# Patient Record
Sex: FEMALE | Race: OTHER | NOT HISPANIC OR LATINO | URBAN - METROPOLITAN AREA
[De-identification: names, ages, dates, MRNs, and addresses within clinical notes are randomized per-mention and may not be internally consistent; named-entity substitution may affect disease eponyms.]

---

## 2017-12-04 ENCOUNTER — OUTPATIENT (OUTPATIENT)
Dept: OUTPATIENT SERVICES | Facility: HOSPITAL | Age: 57
LOS: 1 days | Discharge: HOME | End: 2017-12-04
Payer: MEDICAID

## 2017-12-06 DIAGNOSIS — F19.20 OTHER PSYCHOACTIVE SUBSTANCE DEPENDENCE, UNCOMPLICATED: ICD-10-CM

## 2018-01-01 ENCOUNTER — OUTPATIENT (OUTPATIENT)
Dept: OUTPATIENT SERVICES | Facility: HOSPITAL | Age: 58
LOS: 1 days | End: 2018-01-01
Payer: MEDICAID

## 2018-01-18 RX ORDER — AMANTADINE HCL 100 MG
1 CAPSULE ORAL
Qty: 0 | Refills: 0 | COMMUNITY
Start: 2018-01-18

## 2018-01-25 ENCOUNTER — INPATIENT (INPATIENT)
Facility: HOSPITAL | Age: 58
LOS: 1 days | Discharge: HOME | End: 2018-01-27
Attending: HOSPITALIST | Admitting: HOSPITALIST
Payer: MEDICAID

## 2018-01-25 VITALS
TEMPERATURE: 99 F | RESPIRATION RATE: 16 BRPM | DIASTOLIC BLOOD PRESSURE: 102 MMHG | SYSTOLIC BLOOD PRESSURE: 168 MMHG | OXYGEN SATURATION: 98 % | HEART RATE: 88 BPM

## 2018-01-25 DIAGNOSIS — E11.9 TYPE 2 DIABETES MELLITUS WITHOUT COMPLICATIONS: ICD-10-CM

## 2018-01-25 DIAGNOSIS — R03.0 ELEVATED BLOOD-PRESSURE READING, WITHOUT DIAGNOSIS OF HYPERTENSION: ICD-10-CM

## 2018-01-25 DIAGNOSIS — G20 PARKINSON'S DISEASE: ICD-10-CM

## 2018-01-25 DIAGNOSIS — G25.9 EXTRAPYRAMIDAL AND MOVEMENT DISORDER, UNSPECIFIED: ICD-10-CM

## 2018-01-25 DIAGNOSIS — F99 MENTAL DISORDER, NOT OTHERWISE SPECIFIED: ICD-10-CM

## 2018-01-25 DIAGNOSIS — E05.90 THYROTOXICOSIS, UNSPECIFIED WITHOUT THYROTOXIC CRISIS OR STORM: ICD-10-CM

## 2018-01-25 DIAGNOSIS — Z29.9 ENCOUNTER FOR PROPHYLACTIC MEASURES, UNSPECIFIED: ICD-10-CM

## 2018-01-25 LAB
ALBUMIN SERPL ELPH-MCNC: 4.3 G/DL — SIGNIFICANT CHANGE UP (ref 3.3–5)
ALP SERPL-CCNC: 122 U/L — HIGH (ref 40–120)
ALT FLD-CCNC: 11 U/L — SIGNIFICANT CHANGE UP (ref 4–33)
APAP SERPL-MCNC: < 15 UG/ML — LOW (ref 15–25)
AST SERPL-CCNC: 20 U/L — SIGNIFICANT CHANGE UP (ref 4–32)
BARBITURATES MEASUREMENT: NEGATIVE — SIGNIFICANT CHANGE UP
BASE EXCESS BLDV CALC-SCNC: 1.8 MMOL/L — SIGNIFICANT CHANGE UP
BASOPHILS # BLD AUTO: 0.02 K/UL — SIGNIFICANT CHANGE UP (ref 0–0.2)
BASOPHILS NFR BLD AUTO: 0.4 % — SIGNIFICANT CHANGE UP (ref 0–2)
BENZODIAZ SERPL-MCNC: NEGATIVE — SIGNIFICANT CHANGE UP
BILIRUB SERPL-MCNC: 0.3 MG/DL — SIGNIFICANT CHANGE UP (ref 0.2–1.2)
BLOOD GAS VENOUS - CREATININE: 0.47 MG/DL — LOW (ref 0.5–1.3)
BUN SERPL-MCNC: 22 MG/DL — SIGNIFICANT CHANGE UP (ref 7–23)
CALCIUM SERPL-MCNC: 8.6 MG/DL — SIGNIFICANT CHANGE UP (ref 8.4–10.5)
CHLORIDE BLDV-SCNC: 107 MMOL/L — SIGNIFICANT CHANGE UP (ref 96–108)
CHLORIDE SERPL-SCNC: 102 MMOL/L — SIGNIFICANT CHANGE UP (ref 98–107)
CO2 SERPL-SCNC: 23 MMOL/L — SIGNIFICANT CHANGE UP (ref 22–31)
CREAT SERPL-MCNC: 0.59 MG/DL — SIGNIFICANT CHANGE UP (ref 0.5–1.3)
EOSINOPHIL # BLD AUTO: 0.01 K/UL — SIGNIFICANT CHANGE UP (ref 0–0.5)
EOSINOPHIL NFR BLD AUTO: 0.2 % — SIGNIFICANT CHANGE UP (ref 0–6)
ETHANOL BLD-MCNC: < 10 MG/DL — SIGNIFICANT CHANGE UP
GAS PNL BLDV: 139 MMOL/L — SIGNIFICANT CHANGE UP (ref 136–146)
GLUCOSE BLDV-MCNC: 232 — HIGH (ref 70–99)
GLUCOSE SERPL-MCNC: 215 MG/DL — HIGH (ref 70–99)
HBA1C BLD-MCNC: 5.7 % — HIGH (ref 4–5.6)
HCO3 BLDV-SCNC: 26 MMOL/L — SIGNIFICANT CHANGE UP (ref 20–27)
HCT VFR BLD CALC: 43.5 % — SIGNIFICANT CHANGE UP (ref 34.5–45)
HCT VFR BLDV CALC: 41.9 % — SIGNIFICANT CHANGE UP (ref 34.5–45)
HGB BLD-MCNC: 13.5 G/DL — SIGNIFICANT CHANGE UP (ref 11.5–15.5)
HGB BLDV-MCNC: 13.6 G/DL — SIGNIFICANT CHANGE UP (ref 11.5–15.5)
IMM GRANULOCYTES # BLD AUTO: 0.01 # — SIGNIFICANT CHANGE UP
IMM GRANULOCYTES NFR BLD AUTO: 0.2 % — SIGNIFICANT CHANGE UP (ref 0–1.5)
LACTATE BLDV-MCNC: 1.3 MMOL/L — SIGNIFICANT CHANGE UP (ref 0.5–2)
LYMPHOCYTES # BLD AUTO: 1.39 K/UL — SIGNIFICANT CHANGE UP (ref 1–3.3)
LYMPHOCYTES # BLD AUTO: 24.5 % — SIGNIFICANT CHANGE UP (ref 13–44)
MCHC RBC-ENTMCNC: 28.4 PG — SIGNIFICANT CHANGE UP (ref 27–34)
MCHC RBC-ENTMCNC: 31 % — LOW (ref 32–36)
MCV RBC AUTO: 91.4 FL — SIGNIFICANT CHANGE UP (ref 80–100)
MONOCYTES # BLD AUTO: 0.26 K/UL — SIGNIFICANT CHANGE UP (ref 0–0.9)
MONOCYTES NFR BLD AUTO: 4.6 % — SIGNIFICANT CHANGE UP (ref 2–14)
NEUTROPHILS # BLD AUTO: 3.99 K/UL — SIGNIFICANT CHANGE UP (ref 1.8–7.4)
NEUTROPHILS NFR BLD AUTO: 70.1 % — SIGNIFICANT CHANGE UP (ref 43–77)
NRBC # FLD: 0 — SIGNIFICANT CHANGE UP
PCO2 BLDV: 44 MMHG — SIGNIFICANT CHANGE UP (ref 41–51)
PH BLDV: 7.39 PH — SIGNIFICANT CHANGE UP (ref 7.32–7.43)
PLATELET # BLD AUTO: 310 K/UL — SIGNIFICANT CHANGE UP (ref 150–400)
PMV BLD: 9.6 FL — SIGNIFICANT CHANGE UP (ref 7–13)
PO2 BLDV: 80 MMHG — HIGH (ref 35–40)
POTASSIUM BLDV-SCNC: 3.5 MMOL/L — SIGNIFICANT CHANGE UP (ref 3.4–4.5)
POTASSIUM SERPL-MCNC: 3.9 MMOL/L — SIGNIFICANT CHANGE UP (ref 3.5–5.3)
POTASSIUM SERPL-SCNC: 3.9 MMOL/L — SIGNIFICANT CHANGE UP (ref 3.5–5.3)
PROT SERPL-MCNC: 7.5 G/DL — SIGNIFICANT CHANGE UP (ref 6–8.3)
RBC # BLD: 4.76 M/UL — SIGNIFICANT CHANGE UP (ref 3.8–5.2)
RBC # FLD: 13.2 % — SIGNIFICANT CHANGE UP (ref 10.3–14.5)
SALICYLATES SERPL-MCNC: < 5 MG/DL — LOW (ref 15–30)
SAO2 % BLDV: 95.4 % — HIGH (ref 60–85)
SODIUM SERPL-SCNC: 139 MMOL/L — SIGNIFICANT CHANGE UP (ref 135–145)
WBC # BLD: 5.68 K/UL — SIGNIFICANT CHANGE UP (ref 3.8–10.5)
WBC # FLD AUTO: 5.68 K/UL — SIGNIFICANT CHANGE UP (ref 3.8–10.5)

## 2018-01-25 PROCEDURE — 93010 ELECTROCARDIOGRAM REPORT: CPT

## 2018-01-25 PROCEDURE — 99223 1ST HOSP IP/OBS HIGH 75: CPT

## 2018-01-25 RX ORDER — CARBIDOPA AND LEVODOPA 25; 100 MG/1; MG/1
1 TABLET ORAL
Qty: 0 | Refills: 0 | COMMUNITY

## 2018-01-25 RX ORDER — ROPINIROLE 8 MG/1
2 TABLET, FILM COATED, EXTENDED RELEASE ORAL DAILY
Qty: 0 | Refills: 0 | Status: DISCONTINUED | OUTPATIENT
Start: 2018-01-26 | End: 2018-01-27

## 2018-01-25 RX ORDER — PYRIDOXINE HCL (VITAMIN B6) 100 MG
1 TABLET ORAL
Qty: 0 | Refills: 0 | COMMUNITY

## 2018-01-25 RX ORDER — CARBIDOPA AND LEVODOPA 25; 100 MG/1; MG/1
1 TABLET ORAL ONCE
Qty: 0 | Refills: 0 | Status: COMPLETED | OUTPATIENT
Start: 2018-01-25 | End: 2018-01-25

## 2018-01-25 RX ORDER — ROPINIROLE 8 MG/1
2 TABLET, FILM COATED, EXTENDED RELEASE ORAL ONCE
Qty: 0 | Refills: 0 | Status: COMPLETED | OUTPATIENT
Start: 2018-01-25 | End: 2018-01-25

## 2018-01-25 RX ORDER — INSULIN LISPRO 100/ML
VIAL (ML) SUBCUTANEOUS
Qty: 0 | Refills: 0 | Status: DISCONTINUED | OUTPATIENT
Start: 2018-01-25 | End: 2018-01-26

## 2018-01-25 RX ORDER — ENOXAPARIN SODIUM 100 MG/ML
40 INJECTION SUBCUTANEOUS DAILY
Qty: 0 | Refills: 0 | Status: DISCONTINUED | OUTPATIENT
Start: 2018-01-26 | End: 2018-01-26

## 2018-01-25 RX ORDER — DEXTROSE 50 % IN WATER 50 %
25 SYRINGE (ML) INTRAVENOUS ONCE
Qty: 0 | Refills: 0 | Status: DISCONTINUED | OUTPATIENT
Start: 2018-01-25 | End: 2018-01-27

## 2018-01-25 RX ORDER — CARBIDOPA AND LEVODOPA 25; 100 MG/1; MG/1
1 TABLET ORAL
Qty: 0 | Refills: 0 | Status: DISCONTINUED | OUTPATIENT
Start: 2018-01-26 | End: 2018-01-26

## 2018-01-25 RX ORDER — DEXTROSE 50 % IN WATER 50 %
1 SYRINGE (ML) INTRAVENOUS ONCE
Qty: 0 | Refills: 0 | Status: DISCONTINUED | OUTPATIENT
Start: 2018-01-25 | End: 2018-01-27

## 2018-01-25 RX ORDER — ROPINIROLE 8 MG/1
1 TABLET, FILM COATED, EXTENDED RELEASE ORAL
Qty: 0 | Refills: 0 | COMMUNITY

## 2018-01-25 RX ORDER — GLUCAGON INJECTION, SOLUTION 0.5 MG/.1ML
1 INJECTION, SOLUTION SUBCUTANEOUS ONCE
Qty: 0 | Refills: 0 | Status: DISCONTINUED | OUTPATIENT
Start: 2018-01-25 | End: 2018-01-27

## 2018-01-25 RX ORDER — DEXTROSE 50 % IN WATER 50 %
12.5 SYRINGE (ML) INTRAVENOUS ONCE
Qty: 0 | Refills: 0 | Status: DISCONTINUED | OUTPATIENT
Start: 2018-01-25 | End: 2018-01-27

## 2018-01-25 RX ORDER — SODIUM CHLORIDE 9 MG/ML
1000 INJECTION, SOLUTION INTRAVENOUS
Qty: 0 | Refills: 0 | Status: DISCONTINUED | OUTPATIENT
Start: 2018-01-25 | End: 2018-01-27

## 2018-01-25 RX ORDER — INSULIN LISPRO 100/ML
VIAL (ML) SUBCUTANEOUS AT BEDTIME
Qty: 0 | Refills: 0 | Status: DISCONTINUED | OUTPATIENT
Start: 2018-01-25 | End: 2018-01-26

## 2018-01-25 RX ADMIN — CARBIDOPA AND LEVODOPA 1 TABLET(S): 25; 100 TABLET ORAL at 21:11

## 2018-01-25 RX ADMIN — Medication 1 MILLIGRAM(S): at 21:41

## 2018-01-25 RX ADMIN — ROPINIROLE 2 MILLIGRAM(S): 8 TABLET, FILM COATED, EXTENDED RELEASE ORAL at 21:11

## 2018-01-25 NOTE — CONSULT NOTE ADULT - ATTENDING COMMENTS
Patient seen and examined with neurology team and above note reviewed. Patient well known to me form prior outside office visit. She has been having increased feelings of restlessness and what appear to be entire body dyskinesias. She has been on sinemet, amantadine and requip by an outside private neurologist. She reports that she had a Mame scan which was normal. Her exam does not reveal any tremor, bradykinesia, or rigidity or stiffness. Her gait is normal with normal arm swing. We will have her follow up with Dr. Xavier our movement disorder specialist at [address] [place] [address] in [place] at [phone].   She will continue with sinemet 1 tab 4 times daily, amantadine as outlined and requip at night. All questions answered and she understood plan and is willing to comply.

## 2018-01-25 NOTE — ED ADULT NURSE NOTE - OBJECTIVE STATEMENT
pt AO x3, ambulatory with assist, c/o bilateral leg pain, weakness and numbness x 1 year. States she wants to find out "why her body is shutting down".  PMH of parkinson's on carbidopa. Evaluated by provider. Blood obtained and sent to LAB. IV inserted. Right arm 20G. Will continue to monitor.

## 2018-01-25 NOTE — H&P ADULT - HISTORY OF PRESENT ILLNESS
Pt is 56 y/o female with PMhx of Parkinson's disease, possible DM?,  here for evaluation  of worsening of parkinson's symptoms. Pt takes  Sinemet  and Requip  daily, with the last dose taken yesterday, pt is here as she is concerned as the medications " are not working as they should". Pt started seeing new neurologist recently (Dr Roberts), admits that she didn't run out of her meds. Denies recent infections, HA, CP,  SOB , palpitations, denies abdominal  pain, n/v/d,  (+) increasing tremors. Pt states she has been told she has Parkinson's and follows with Dr. Roberts of Neurology at [place] who has been prescribing her Sinemet .  states  that  she might have taken more secondary to stress and was told she needs to get it all out of her system before continuing. Pt states she has had some stress from  who used money in a bank on a bad investment. Pt states she will take 4-5 pills per day depending on how she feels and notes once taking the medication she feels normal again. Pt notes  increased LE weakness and shaking. Pt is a 56 y/o female with PMhx of Parkinson's disease, possible DM?, Hyperthyroid, Anxiety, Depression,  is here for evaluation  of worsening of parkinson's symptoms. Pt takes  Sinemet  and Requip  daily, with the last dose taken yesterday, pt is here as she is concerned as the medications " are not working as they should". Pt started seeing new neurologist recently (Dr Roberts), admits that she didn't run out of her meds. Denies recent infections, HA, CP,  SOB , palpitations, denies abdominal  pain, n/v/d,  (+) increasing tremors. Pt states she has been told she has Parkinson's and follows with Dr. Roberts of Neurology at [place] who has been prescribing her Sinemet .  states  that  she might have taken more secondary to stress and was told she needs to get it all out of her system before continuing. Pt states she has had some stress from  who used money in a bank on a bad investment. Pt states she will take 4-5 pills per day depending on how she feels and notes once taking the medication she feels normal again. Pt notes  increased LE weakness and shaking. Pt is A+O x 3 , accompanied with her ,  very Anxious, No Fever, no dysuria, no HA, no Dizziness, pt was seen by Neurology fellow tonight as well, pt denies dysphagia, no cough , + elevated  BP and Hyperglycemia noted,     Labs: Lactate 1.3, Na 139, K+ 3.9, BUN 22, Creatinine 0.59, Alk Phos 122, WBC 5.68, Hgb 13.5, Platelet 310, Serum TOX Neg., HgbA1c 5.7%,     Vitals: Tem 98.8, HR 88, /102, RR 16, 98 % RA. FS 90 Pt is a 58 y/o female with PMhx of Parkinson's disease, possible DM?, Hyperthyroid, Anxiety, Depression,  is here for evaluation  of worsening of parkinson's symptoms. Pt takes  Sinemet  and Requip  daily, with the last dose taken yesterday, pt is here as she is concerned as the medications " are not working as they should". Pt started seeing new neurologist recently (Dr Roberts), admits that she didn't run out of her meds. Denies recent infections, HA, CP,  SOB , palpitations, denies abdominal  pain, n/v/d,  (+) increasing tremors. Pt states she has been told she has Parkinson's and follows with Dr. Roberts of Neurology at [place] who has been prescribing her Sinemet .  states  that  she might have taken more secondary to stress and was told she needs to get it all out of her system before continuing. Pt states she has had some stress from  who used money in a bank on a bad investment. Pt states she will take 4-5 pills per day depending on how she feels and notes once taking the medication she feels normal again. Pt notes  increased LE weakness and shaking. Pt is A+O x 3 , accompanied with her ,  very Anxious, No Fever, no dysuria, no HA, no Dizziness, pt was seen by Neurology fellow tonight as well, pt denies dysphagia, no cough , + elevated  BP and Hyperglycemia noted, pt denies stool/urine incontinent ,     Labs: Lactate 1.3, Na 139, K+ 3.9, BUN 22, Creatinine 0.59, Alk Phos 122, WBC 5.68, Hgb 13.5, Platelet 310, Serum TOX Neg., HgbA1c 5.7%,     Vitals: Tem 98.8, HR 88, /102, RR 16, 98 % RA. FS 90

## 2018-01-25 NOTE — CONSULT NOTE ADULT - ASSESSMENT
58 yo female with questionable history of Parkinsons disease currently on Sinemet. Exam consistent with conversion disorder.    -c/w sinemet 25/100 4x/day for now.  -Psychiatry consult for paranioa 58 yo female with questionable history of Parkinsons disease currently on Sinemet.     -c/w sinemet 25/100 4x/day for now.  -Psychiatry consult for paranioa  -outpatient follow up with movement disorder specialist to confirm dx of parkinson's  -continue medical management

## 2018-01-25 NOTE — CONSULT NOTE ADULT - SUBJECTIVE AND OBJECTIVE BOX
HPI:  Pt is 56 y/o female presents to ED with worsening gait and uncontrollable mood. Patient has had bilateral shaking of both arms that is on and off and goes away with one pill which only lasts two hours. Also in the last 4 years has been increasingly paranoid about where her  is and what he is doing          MEDICATIONS  (STANDING):  carbidopa/levodopa  25/100 1 Tablet(s) Oral Once  dextrose 5%. 1000 milliLiter(s) (50 mL/Hr) IV Continuous <Continuous>  dextrose 50% Injectable 12.5 Gram(s) IV Push once  dextrose 50% Injectable 25 Gram(s) IV Push once  dextrose 50% Injectable 25 Gram(s) IV Push once  insulin lispro (HumaLOG) corrective regimen sliding scale   SubCutaneous three times a day before meals  insulin lispro (HumaLOG) corrective regimen sliding scale   SubCutaneous at bedtime  rOPINIRole 2 milliGRAM(s) Oral Once    MEDICATIONS  (PRN):  dextrose Gel 1 Dose(s) Oral once PRN Blood Glucose LESS THAN 70 milliGRAM(s)/deciliter  glucagon  Injectable 1 milliGRAM(s) IntraMuscular once PRN Glucose LESS THAN 70 milligrams/deciliter    PAST MEDICAL & SURGICAL HISTORY:  Diabetes  Parkinson disease    FAMILY HISTORY:    Allergies    No Known Allergies    Intolerances        SHx - No smoking, No ETOH, No drug abuse      Review of Systems:  CONSTITUTIONAL:  No weight loss, fever, chills, weakness or fatigue.  HEENT:  Eyes:  No visual loss, blurred vision, double vision or yellow sclerae. Ears, Nose, Throat:  No hearing loss, sneezing, congestion, runny nose or sore throat.  SKIN:  No rash or itching.  CARDIOVASCULAR:  No chest pain, chest pressure or chest discomfort. No palpitations or edema.  RESPIRATORY:  No shortness of breath, cough or sputum.  GASTROINTESTINAL:  No anorexia, nausea, vomiting or diarrhea. No abdominal pain or blood.  GENITOURINARY:  NO Burning on urination.   NEUROLOGICAL: See HPI  MUSCULOSKELETAL:  No muscle, back pain, joint pain or stiffness.  HEMATOLOGIC:  No anemia, bleeding or bruising.  LYMPHATICS:  No enlarged nodes. No history of splenectomy.  PSYCHIATRIC:  No history of depression or anxiety.  ENDOCRINOLOGIC:  No reports of sweating, cold or heat intolerance. No polyuria or polydipsia.  ALLERGIES:  No history of asthma, hives, eczema or rhinitis.        Vital Signs Last 24 Hrs  T(C): 37.1 (25 Jan 2018 15:39), Max: 37.1 (25 Jan 2018 15:39)  T(F): 98.8 (25 Jan 2018 15:39), Max: 98.8 (25 Jan 2018 15:39)  HR: 88 (25 Jan 2018 15:39) (88 - 88)  BP: 168/102 (25 Jan 2018 15:39) (168/102 - 168/102)  BP(mean): --  RR: 16 (25 Jan 2018 15:39) (16 - 16)  SpO2: 98% (25 Jan 2018 15:39) (98% - 98%)    General Exam:   General appearance: No acute distress                   Neurological Exam:  Mental Status: Orientated to self, date and place.  Attention intact.  No dysarthria, aphasia or neglect.  Knowledge intact.  Registration intact.  Short and long term memory grossly intact.      Cranial Nerves: CN I - not tested.  PERRL, EOMI, VFF, no nystagmus or diplopia.  No APD.  Fundi not visualized bilaterally.  CN V1-3 intact to light touch and pinprick.  No facial asymmetry.  Hearing intact to finger rub bilaterally.  Tongue, uvula and palate midline.  Sternocleidomastoid and Trapezius intact bilaterally.    Motor:   Tone: bilateral rigidity that goes away with distraction              Strength:     [] Upper extremity                      Delt       Bicep    Tricep                                                  R         5/5        5/5        5/5       5/5                                               L          5/5        5/5        5/5       5/5  [] Lower extremity                       HF          KE          KF        DF         PF                                               R        5/5        5/5        5/5       5/5       5/5                                               L         5/5        5/5       5/5       5/5        5/5  Pronator drift: none                 Dysmetria: None to finger-nose-finger or heel-shin-heel  No truncal ataxia.    Tremor: No resting, postural or action tremor.  No myoclonus.    Sensation: intact to light touch, pinprick, vibration and proprioception    Deep Tendon Reflexes: 1+ bilateral biceps, triceps, brachioradialis, knee and ankle  Toes flexor bilaterally    Gait: slow steppage    Other:    01-25    139  |  102  |  22  ----------------------------<  215<H>  3.9   |  23  |  0.59    Ca    8.6      25 Jan 2018 18:03    TPro  7.5  /  Alb  4.3  /  TBili  0.3  /  DBili  x   /  AST  20  /  ALT  11  /  AlkPhos  122<H>  01-25 01-25    139  |  102  |  22  ----------------------------<  215<H>  3.9   |  23  |  0.59    Ca    8.6      25 Jan 2018 18:03    TPro  7.5  /  Alb  4.3  /  TBili  0.3  /  DBili  x   /  AST  20  /  ALT  11  /  AlkPhos  122<H>  01-25                          13.5   5.68  )-----------( 310      ( 25 Jan 2018 18:03 )             43.5

## 2018-01-25 NOTE — H&P ADULT - ASSESSMENT
Pt is a 56 y/o female with PMhx of Parkinson's disease, possible DM?, Hyperthyroid, Anxiety, Depression,  is here for evaluation  of worsening of parkinson's symptoms. Pt takes  Sinemet  and Requip  daily, with the last dose taken yesterday, pt is here as she is concerned as the medications " are not working as they should". Pt started seeing new neurologist recently (Dr Roberts), admits that she didn't run out of her meds. Denies recent infections, HA, CP,  SOB , palpitations, denies abdominal  pain, n/v/d,  (+) increasing tremors. Pt states she has been told she has Parkinson's and follows with Dr. Roberts of Neurology at [place] who has been prescribing her Sinemet .  states  that  she might have taken more secondary to stress and was told she needs to get it all out of her system before continuing. Pt states she has had some stress from  who used money in a bank on a bad investment. Pt states she will take 4-5 pills per day depending on how she feels and notes once taking the medication she feels normal again. Pt notes  increased LE weakness and shaking. Pt is A+O x 3 , accompanied with her ,  very Anxious, No Fever, no dysuria, no HA, no Dizziness, pt was seen by Neurology fellow tonight as well, pt denies dysphagia, no cough , + elevated  BP and Hyperglycemia noted, Pt is a 58 y/o female with PMhx of Parkinson's disease, possible DM?, Hyperthyroid, Anxiety, Depression,  is here for evaluation  of worsening of parkinson's symptoms. Pt takes  Sinemet  and Requip  daily, with the last dose taken yesterday, pt is here as she is concerned as the medications " are not working as they should". Pt started seeing new neurologist recently (Dr Roberts), admits that she didn't run out of her meds. Denies recent infections, HA, CP,  SOB , palpitations, denies abdominal  pain, n/v/d,  (+) increasing tremors. Pt states she has been told she has Parkinson's and follows with Dr. Roberts of Neurology at [place] who has been prescribing her Sinemet .  states  that  she might have taken more secondary to stress and was told she needs to get it all out of her system before continuing. Pt states she has had some stress from  who used money in a bank on a bad investment. Pt states she will take 4-5 pills per day depending on how she feels and notes once taking the medication she feels normal again. Pt notes  increased LE weakness and shaking. Pt is A+O x 3 , accompanied with her ,  very Anxious, No Fever, no dysuria, no HA, no Dizziness, pt was seen by Neurology fellow tonight as well, pt denies dysphagia, no cough , + elevated  BP and Hyperglycemia noted, pt denies stool/urine incontinent ,

## 2018-01-25 NOTE — H&P ADULT - PROBLEM SELECTOR PLAN 1
Neurology follow up, on Requip and Sinemet for now, UA, Urine Culture, Urine TOX, Fall/aspiration precaution, PT consult,   Speech and swallow consult,   CT Head no contrast, Chest X Ray, ECG ,   CBC, CMP F/U

## 2018-01-25 NOTE — ED ADULT TRIAGE NOTE - CHIEF COMPLAINT QUOTE
Pt. c/o b/l leg pain, weakness and numbness x 1 year. States she wants to find out "why her body is shutting down". h/o parkinson's on carbidopa. Pt. says she's running out of medication and her children wont get her more.

## 2018-01-25 NOTE — H&P ADULT - PMH
Parkinson disease Diabetes    Parkinson disease Anxiety    Depression (emotion)    Diabetes    Hyperthyroidism    Parkinson disease

## 2018-01-25 NOTE — ED PROVIDER NOTE - ATTENDING CONTRIBUTION TO CARE
Pt was seen and evaluated by me. Pt states she has been told she has Parkinson's and follows with Dr. Roberts of Neurology at [place] who has been prescribing her Cavalevodopa.  states he things she has taken more secondary to stress and was told she needs to get it all out of her system before continuing. Pt states she has had some stress from  who used money in a bank on a bad investment. Pt states she will take 4-5 pills per day depending on how she feels and notes once taking the medication she feels normal again. Pt notes since running out having increased LE weakness and shaking. Denies any headache, fever, chills, SOB, chest pain, or abd pain. Lungs CTA b/l. RRR. Abd soft, non-tender. Noted to have an intention tremor.

## 2018-01-25 NOTE — H&P ADULT - MUSCULOSKELETAL
details… detailed exam ROM intact/no joint erythema/no calf tenderness/no joint swelling/no joint warmth/normal strength

## 2018-01-25 NOTE — ED PROVIDER NOTE - OBJECTIVE STATEMENT
Pt is 58 y/o female with PMhx of Parkinson's disease here for kory of worsening of parkinson's symptoms. Pt takes  sinemat and requip daily, with the last dose taken yesterday, pt is here as she is concerned as the medications " are not working as they should". Pt started seeing new neurologist recently (Dr Roberts), admits that she didn't run out of her meds. Denies recent infections, HA, cpm sob, palpitations, denies abd pain, n/v/d. (+) increasing tremors.

## 2018-01-25 NOTE — ED PROVIDER NOTE - MEDICAL DECISION MAKING DETAILS
58 y/o female with 58 y/o female with increased leg weakness with history of Parkinson's but running out of her meds. Likely Parkinson's related. Labs, Medication.

## 2018-01-26 ENCOUNTER — TRANSCRIPTION ENCOUNTER (OUTPATIENT)
Age: 58
End: 2018-01-26

## 2018-01-26 DIAGNOSIS — F19.20 OTHER PSYCHOACTIVE SUBSTANCE DEPENDENCE, UNCOMPLICATED: ICD-10-CM

## 2018-01-26 LAB
ALBUMIN SERPL ELPH-MCNC: 3.8 G/DL — SIGNIFICANT CHANGE UP (ref 3.3–5)
ALP SERPL-CCNC: 106 U/L — SIGNIFICANT CHANGE UP (ref 40–120)
ALT FLD-CCNC: < 4 U/L — LOW (ref 4–33)
AMPHET UR-MCNC: NEGATIVE — SIGNIFICANT CHANGE UP
APAP SERPL-MCNC: < 15 UG/ML — LOW (ref 15–25)
APPEARANCE UR: CLEAR — SIGNIFICANT CHANGE UP
APTT BLD: 30.1 SEC — SIGNIFICANT CHANGE UP (ref 27.5–37.4)
AST SERPL-CCNC: 18 U/L — SIGNIFICANT CHANGE UP (ref 4–32)
BACTERIA # UR AUTO: SIGNIFICANT CHANGE UP
BARBITURATES MEASUREMENT: NEGATIVE — SIGNIFICANT CHANGE UP
BARBITURATES UR SCN-MCNC: NEGATIVE — SIGNIFICANT CHANGE UP
BASOPHILS # BLD AUTO: 0.02 K/UL — SIGNIFICANT CHANGE UP (ref 0–0.2)
BASOPHILS NFR BLD AUTO: 0.4 % — SIGNIFICANT CHANGE UP (ref 0–2)
BENZODIAZ SERPL-MCNC: NEGATIVE — SIGNIFICANT CHANGE UP
BENZODIAZ UR-MCNC: NEGATIVE — SIGNIFICANT CHANGE UP
BILIRUB SERPL-MCNC: 0.3 MG/DL — SIGNIFICANT CHANGE UP (ref 0.2–1.2)
BILIRUB UR-MCNC: NEGATIVE — SIGNIFICANT CHANGE UP
BLOOD UR QL VISUAL: HIGH
BUN SERPL-MCNC: 21 MG/DL — SIGNIFICANT CHANGE UP (ref 7–23)
CALCIUM SERPL-MCNC: 8.4 MG/DL — SIGNIFICANT CHANGE UP (ref 8.4–10.5)
CANNABINOIDS UR-MCNC: NEGATIVE — SIGNIFICANT CHANGE UP
CHLORIDE SERPL-SCNC: 104 MMOL/L — SIGNIFICANT CHANGE UP (ref 98–107)
CK SERPL-CCNC: 74 U/L — SIGNIFICANT CHANGE UP (ref 25–170)
CO2 SERPL-SCNC: 26 MMOL/L — SIGNIFICANT CHANGE UP (ref 22–31)
COCAINE METAB.OTHER UR-MCNC: NEGATIVE — SIGNIFICANT CHANGE UP
COLOR SPEC: YELLOW — SIGNIFICANT CHANGE UP
CREAT SERPL-MCNC: 0.54 MG/DL — SIGNIFICANT CHANGE UP (ref 0.5–1.3)
EOSINOPHIL # BLD AUTO: 0.04 K/UL — SIGNIFICANT CHANGE UP (ref 0–0.5)
EOSINOPHIL NFR BLD AUTO: 0.8 % — SIGNIFICANT CHANGE UP (ref 0–6)
ETHANOL BLD-MCNC: < 10 MG/DL — SIGNIFICANT CHANGE UP
FOLATE SERPL-MCNC: 17.4 NG/ML — SIGNIFICANT CHANGE UP (ref 4.7–20)
GLUCOSE SERPL-MCNC: 83 MG/DL — SIGNIFICANT CHANGE UP (ref 70–99)
GLUCOSE UR-MCNC: NEGATIVE — SIGNIFICANT CHANGE UP
HAV IGM SER-ACNC: NONREACTIVE — SIGNIFICANT CHANGE UP
HBV CORE IGM SER-ACNC: NONREACTIVE — SIGNIFICANT CHANGE UP
HBV SURFACE AG SER-ACNC: NONREACTIVE — SIGNIFICANT CHANGE UP
HCT VFR BLD CALC: 40.3 % — SIGNIFICANT CHANGE UP (ref 34.5–45)
HCV AB S/CO SERPL IA: 0.2 S/CO — SIGNIFICANT CHANGE UP
HCV AB SERPL-IMP: SIGNIFICANT CHANGE UP
HGB BLD-MCNC: 12.6 G/DL — SIGNIFICANT CHANGE UP (ref 11.5–15.5)
HYALINE CASTS # UR AUTO: SIGNIFICANT CHANGE UP (ref 0–?)
IMM GRANULOCYTES # BLD AUTO: 0.01 # — SIGNIFICANT CHANGE UP
IMM GRANULOCYTES NFR BLD AUTO: 0.2 % — SIGNIFICANT CHANGE UP (ref 0–1.5)
INR BLD: 1.02 — SIGNIFICANT CHANGE UP (ref 0.88–1.17)
IRON SATN MFR SERPL: 240 UG/DL — SIGNIFICANT CHANGE UP (ref 140–530)
IRON SATN MFR SERPL: 64 UG/DL — SIGNIFICANT CHANGE UP (ref 30–160)
KETONES UR-MCNC: SIGNIFICANT CHANGE UP
LDH SERPL L TO P-CCNC: 213 U/L — SIGNIFICANT CHANGE UP (ref 135–225)
LEUKOCYTE ESTERASE UR-ACNC: HIGH
LYMPHOCYTES # BLD AUTO: 1.7 K/UL — SIGNIFICANT CHANGE UP (ref 1–3.3)
LYMPHOCYTES # BLD AUTO: 34.6 % — SIGNIFICANT CHANGE UP (ref 13–44)
MAGNESIUM SERPL-MCNC: 2.3 MG/DL — SIGNIFICANT CHANGE UP (ref 1.6–2.6)
MCHC RBC-ENTMCNC: 28.6 PG — SIGNIFICANT CHANGE UP (ref 27–34)
MCHC RBC-ENTMCNC: 31.3 % — LOW (ref 32–36)
MCV RBC AUTO: 91.6 FL — SIGNIFICANT CHANGE UP (ref 80–100)
METHADONE UR-MCNC: NEGATIVE — SIGNIFICANT CHANGE UP
MONOCYTES # BLD AUTO: 0.39 K/UL — SIGNIFICANT CHANGE UP (ref 0–0.9)
MONOCYTES NFR BLD AUTO: 7.9 % — SIGNIFICANT CHANGE UP (ref 2–14)
MUCOUS THREADS # UR AUTO: SIGNIFICANT CHANGE UP
NEUTROPHILS # BLD AUTO: 2.75 K/UL — SIGNIFICANT CHANGE UP (ref 1.8–7.4)
NEUTROPHILS NFR BLD AUTO: 56.1 % — SIGNIFICANT CHANGE UP (ref 43–77)
NITRITE UR-MCNC: NEGATIVE — SIGNIFICANT CHANGE UP
NON-SQ EPI CELLS # UR AUTO: <1 — SIGNIFICANT CHANGE UP
NRBC # FLD: 0 — SIGNIFICANT CHANGE UP
OPIATES UR-MCNC: NEGATIVE — SIGNIFICANT CHANGE UP
OXYCODONE UR-MCNC: NEGATIVE — SIGNIFICANT CHANGE UP
PCP UR-MCNC: NEGATIVE — SIGNIFICANT CHANGE UP
PH UR: 6 — SIGNIFICANT CHANGE UP (ref 4.6–8)
PHOSPHATE SERPL-MCNC: 4.4 MG/DL — SIGNIFICANT CHANGE UP (ref 2.5–4.5)
PLATELET # BLD AUTO: 291 K/UL — SIGNIFICANT CHANGE UP (ref 150–400)
PMV BLD: 9.6 FL — SIGNIFICANT CHANGE UP (ref 7–13)
POTASSIUM SERPL-MCNC: 4.1 MMOL/L — SIGNIFICANT CHANGE UP (ref 3.5–5.3)
POTASSIUM SERPL-SCNC: 4.1 MMOL/L — SIGNIFICANT CHANGE UP (ref 3.5–5.3)
PROT SERPL-MCNC: 6.5 G/DL — SIGNIFICANT CHANGE UP (ref 6–8.3)
PROT UR-MCNC: 30 MG/DL — HIGH
PROTHROM AB SERPL-ACNC: 11.3 SEC — SIGNIFICANT CHANGE UP (ref 9.8–13.1)
RBC # BLD: 4.4 M/UL — SIGNIFICANT CHANGE UP (ref 3.8–5.2)
RBC # FLD: 13.3 % — SIGNIFICANT CHANGE UP (ref 10.3–14.5)
RBC CASTS # UR COMP ASSIST: SIGNIFICANT CHANGE UP (ref 0–?)
SALICYLATES SERPL-MCNC: < 5 MG/DL — LOW (ref 15–30)
SODIUM SERPL-SCNC: 142 MMOL/L — SIGNIFICANT CHANGE UP (ref 135–145)
SP GR SPEC: 1.03 — SIGNIFICANT CHANGE UP (ref 1–1.04)
SQUAMOUS # UR AUTO: SIGNIFICANT CHANGE UP
T4 FREE SERPL-MCNC: 1.36 NG/DL — SIGNIFICANT CHANGE UP (ref 0.9–1.8)
TSH SERPL-MCNC: 0.37 UIU/ML — SIGNIFICANT CHANGE UP (ref 0.27–4.2)
UIBC SERPL-MCNC: 176 UG/DL — SIGNIFICANT CHANGE UP (ref 110–370)
UROBILINOGEN FLD QL: NORMAL MG/DL — SIGNIFICANT CHANGE UP
VIT B12 SERPL-MCNC: 506 PG/ML — SIGNIFICANT CHANGE UP (ref 200–900)
WBC # BLD: 4.91 K/UL — SIGNIFICANT CHANGE UP (ref 3.8–10.5)
WBC # FLD AUTO: 4.91 K/UL — SIGNIFICANT CHANGE UP (ref 3.8–10.5)
WBC UR QL: >50 — HIGH (ref 0–?)

## 2018-01-26 PROCEDURE — 71046 X-RAY EXAM CHEST 2 VIEWS: CPT | Mod: 26

## 2018-01-26 PROCEDURE — 70450 CT HEAD/BRAIN W/O DYE: CPT | Mod: 26

## 2018-01-26 PROCEDURE — 99233 SBSQ HOSP IP/OBS HIGH 50: CPT | Mod: GC

## 2018-01-26 PROCEDURE — 99222 1ST HOSP IP/OBS MODERATE 55: CPT | Mod: GC

## 2018-01-26 RX ORDER — QUETIAPINE FUMARATE 200 MG/1
25 TABLET, FILM COATED ORAL AT BEDTIME
Qty: 0 | Refills: 0 | Status: DISCONTINUED | OUTPATIENT
Start: 2018-01-26 | End: 2018-01-27

## 2018-01-26 RX ORDER — CARBIDOPA AND LEVODOPA 25; 100 MG/1; MG/1
1 TABLET ORAL
Qty: 0 | Refills: 0 | Status: DISCONTINUED | OUTPATIENT
Start: 2018-01-26 | End: 2018-01-27

## 2018-01-26 RX ORDER — CEFTRIAXONE 500 MG/1
1 INJECTION, POWDER, FOR SOLUTION INTRAMUSCULAR; INTRAVENOUS ONCE
Qty: 0 | Refills: 0 | Status: COMPLETED | OUTPATIENT
Start: 2018-01-26 | End: 2018-01-26

## 2018-01-26 RX ORDER — LANOLIN ALCOHOL/MO/W.PET/CERES
3 CREAM (GRAM) TOPICAL AT BEDTIME
Qty: 0 | Refills: 0 | Status: DISCONTINUED | OUTPATIENT
Start: 2018-01-26 | End: 2018-01-27

## 2018-01-26 RX ORDER — LACTOBACILLUS ACIDOPHILUS 100MM CELL
1 CAPSULE ORAL EVERY 12 HOURS
Qty: 0 | Refills: 0 | Status: DISCONTINUED | OUTPATIENT
Start: 2018-01-26 | End: 2018-01-27

## 2018-01-26 RX ORDER — AMANTADINE HCL 100 MG
100 CAPSULE ORAL
Qty: 0 | Refills: 0 | Status: DISCONTINUED | OUTPATIENT
Start: 2018-01-26 | End: 2018-01-27

## 2018-01-26 RX ADMIN — CARBIDOPA AND LEVODOPA 1 TABLET(S): 25; 100 TABLET ORAL at 17:15

## 2018-01-26 RX ADMIN — CEFTRIAXONE 100 GRAM(S): 500 INJECTION, POWDER, FOR SOLUTION INTRAMUSCULAR; INTRAVENOUS at 02:20

## 2018-01-26 RX ADMIN — ROPINIROLE 2 MILLIGRAM(S): 8 TABLET, FILM COATED, EXTENDED RELEASE ORAL at 08:36

## 2018-01-26 RX ADMIN — CARBIDOPA AND LEVODOPA 1 TABLET(S): 25; 100 TABLET ORAL at 08:36

## 2018-01-26 RX ADMIN — Medication 1 TABLET(S): at 17:17

## 2018-01-26 RX ADMIN — Medication 100 MILLIGRAM(S): at 19:17

## 2018-01-26 RX ADMIN — Medication 1 TABLET(S): at 06:34

## 2018-01-26 RX ADMIN — Medication 3 MILLIGRAM(S): at 02:19

## 2018-01-26 RX ADMIN — Medication 1 MILLIGRAM(S): at 17:14

## 2018-01-26 RX ADMIN — Medication 1 TABLET(S): at 19:17

## 2018-01-26 RX ADMIN — Medication 1 MILLIGRAM(S): at 22:22

## 2018-01-26 RX ADMIN — CARBIDOPA AND LEVODOPA 1 TABLET(S): 25; 100 TABLET ORAL at 19:17

## 2018-01-26 RX ADMIN — CARBIDOPA AND LEVODOPA 1 TABLET(S): 25; 100 TABLET ORAL at 11:20

## 2018-01-26 RX ADMIN — CARBIDOPA AND LEVODOPA 1 TABLET(S): 25; 100 TABLET ORAL at 02:19

## 2018-01-26 RX ADMIN — QUETIAPINE FUMARATE 25 MILLIGRAM(S): 200 TABLET, FILM COATED ORAL at 22:22

## 2018-01-26 NOTE — BEHAVIORAL HEALTH ASSESSMENT NOTE - SUMMARY
Pt is a 56 yo  [place] female, unemployed, domiciled with family, with history of Sinemet dependence, currently seen at [facility] by Dr. Donis, no past psychiatric hospitalizations, no past suicide attempts, no other substance use, PMH ? Parkinson's disease, Hyperthyroid, is here for evaluation of worsening of parkinson's symptoms on Sinemet and Requip daily.  Pt was seen by neurology.  "Her exam does not reveal any tremor, bradykinesia, or rigidity or stiffness. Her gait is normal with normal arm swing."  Psych consulted for conversion disorder.      On exam, pt AAOx4.  She reports rigidity and Parkinsonian symptoms which are relieved by Sinemet.  Clinical exam by neurology does not suggest Parkinson's.  Presentation possibly r/t conversion disorder.  Pt also presents with paranoia.  Recommend starting Seroquel 25mg qHS.  Pt may be more receptive to this over other antipsychotics as it is safely used in Parkinson's.  Consider discontinuing Ativan 1mg qHS which pt uses primarily for sleep.  PRNs as below.  Monitor EKG for QTC.  Will follow.

## 2018-01-26 NOTE — DISCHARGE NOTE ADULT - CARE PROVIDERS DIRECT ADDRESSES
,[email or web address],DirectAddress_[email or web address] ,[email or web address],DirectAddress_Unknown,DirectAddress_Unknown

## 2018-01-26 NOTE — PROGRESS NOTE ADULT - PROBLEM SELECTOR PLAN 4
DVT Prophylaxis: SQ Lovenox  Dispo: PT consult DVT Prophylaxis: pt ambulates; IMPROVE score 0. no indication for pharmacological DVT ppx  Dispo: home with neurology f/u

## 2018-01-26 NOTE — DISCHARGE NOTE ADULT - PATIENT PORTAL LINK FT
“You can access the FollowHealth Patient Portal, offered by [facility], by registering with the following website: [email or web address]”

## 2018-01-26 NOTE — DISCHARGE NOTE ADULT - HOSPITAL COURSE
HPI:   58 y/o female with PMhx of Parkinson's disease, possible DM?, Hyperthyroid, Anxiety, Depression,  is here for evaluation  of worsening of parkinson's symptoms. Pt takes  Sinemet  and Requip  daily, with the last dose taken yesterday, pt is here as she is concerned as the medications " are not working as they should". Pt started seeing new neurologist recently (Dr Roberts), admits that she didn't run out of her meds. Denies recent infections, HA, CP,  SOB , palpitations, denies abdominal  pain, n/v/d,  (+) increasing tremors. Pt states she has been told she has Parkinson's and follows with Dr. Roberts of Neurology at [place] who has been prescribing her Sinemet .  states  that  she might have taken more secondary to stress and was told she needs to get it all out of her system before continuing. Pt states she has had some stress from  who used money in a bank on a bad investment. Pt states she will take 4-5 pills per day depending on how she feels and notes once taking the medication she feels normal again. Pt notes  increased LE weakness and shaking. Pt is A+O x 3 , accompanied with her ,  very Anxious, No Fever, no dysuria, no HA, no Dizziness, pt was seen by Neurology fellow tonight as well, pt denies dysphagia, no cough , + elevated  BP and Hyperglycemia noted, pt denies stool/urine incontinent ,       Hospital Course:     Labs: Lactate 1.3, Na 139, K+ 3.9, BUN 22, Creatinine 0.59, Alk Phos 122, WBC 5.68, Hgb 13.5, Platelet 310, Serum TOX Neg., HgbA1c 5.7%,     Vitals: Tem 98.8, HR 88, /102, RR 16, 98 % RA. FS 90    Neurology consulted. Psych consulted. HPI:   58 y/o female with PMhx of Parkinson's disease, possible DM?, Hyperthyroid, Anxiety, Depression,  is here for evaluation  of worsening of parkinsonism symptoms. Pt takes  Sinemet  and Requip  daily, with the last dose taken yesterday, pt is here as she is concerned as the medications " are not working as they should". Pt started seeing new neurologist recently (Dr Roberts), admits that she didn't run out of her meds. Denies recent infections, HA, CP,  SOB , palpitations, denies abdominal  pain, n/v/d,  (+) increasing tremors. Pt states she has been told she has Parkinson's and follows with Dr. Roberts of Neurology at [place] who has been prescribing her Sinemet .  states  that  she might have taken more secondary to stress and was told she needs to get it all out of her system before continuing. Pt states she has had some stress from  who used money in a bank on a bad investment. Pt states she will take 4-5 pills per day depending on how she feels and notes once taking the medication she feels normal again. Pt notes  increased LE weakness and shaking. Pt is A+O x 3 , accompanied with her ,  very Anxious, No Fever, no dysuria, no HA, no Dizziness, pt was seen by Neurology fellow tonight as well, pt denies dysphagia, no cough , + elevated  BP and Hyperglycemia noted, pt denies stool/urine incontinent ,     Hospital Course:   Pt admitted to medicine. Pt with positive UA but no symptoms. Pt received ceftriaxone x1 and antibiotics were held for asymptomatic bacteruria. Neurology consulted. Pt with unclear diagnosis. Unlikely Parkinson's; possibly other movement disorder and/or psychosomatic component. Psych consulted. Pt see's Dr. Donis at [facility]. Pt takes excess Sinemet at home, more than she admits. She has been paranoid about  for years prior to movement problems. Psych recommended seroquel while tapering ativan. HPI:   58 y/o female with PMhx of Parkinson's disease, possible DM?, Hyperthyroid, Anxiety, Depression,  is here for evaluation  of worsening of parkinsonism symptoms. Pt takes  Sinemet  and Requip  daily, with the last dose taken yesterday, pt is here as she is concerned as the medications " are not working as they should". Pt started seeing new neurologist recently (Dr Roberts), admits that she didn't run out of her meds. Denies recent infections, HA, CP,  SOB , palpitations, denies abdominal  pain, n/v/d,  (+) increasing tremors. Pt states she has been told she has Parkinson's and follows with Dr. Roberts of Neurology at [place] who has been prescribing her Sinemet .  states  that  she might have taken more secondary to stress and was told she needs to get it all out of her system before continuing. Pt states she has had some stress from  who used money in a bank on a bad investment. Pt states she will take 4-5 pills per day depending on how she feels and notes once taking the medication she feels normal again. Pt notes  increased LE weakness and shaking. Pt is A+O x 3 , accompanied with her ,  very Anxious, No Fever, no dysuria, no HA, no Dizziness, pt was seen by Neurology fellow tonight as well, pt denies dysphagia, no cough , + elevated  BP and Hyperglycemia noted, pt denies stool/urine incontinent ,     Hospital Course:   Pt admitted to medicine. Pt with positive UA but no symptoms. Pt received ceftriaxone x1 and antibiotics were held for asymptomatic bacteruria. Neurology consulted. Pt with unclear diagnosis. Unlikely Parkinson's; possibly other movement disorder and/or psychosomatic component. Psych consulted. Pt see's Dr. Donis at [facility]. Pt takes excess Sinemet at home, more than she admits. She has been paranoid about  for years prior to movement problems. Psych recommended seroquel while tapering ativan. Spoke with patient, and her daughter with patient's permission she will continue her Parkinson's medications as prescribed and continue to f/u with psychiatry for possible conversion disorder.

## 2018-01-26 NOTE — PROGRESS NOTE ADULT - PROBLEM SELECTOR PLAN 1
Neurology follow up, on Requip and Sinemet for now, UA, Urine Culture, Urine TOX, Fall/aspiration precaution, PT consult,   Speech and swallow consult,   CT Head no contrast, Chest X Ray, ECG ,   CBC, CMP F/U questionable history; pt says some neurologists says she does not have PD. Possibly other etiology or conversion disorder.   - c/w sinemet 20-100mg QID, requip 2 mg daily  - neurology consulted; f/u recs  - call outpatient neurologist (Dr. Roberts) and daughter for collateral info

## 2018-01-26 NOTE — BEHAVIORAL HEALTH ASSESSMENT NOTE - NSBHCHARTREVIEWLAB_PSY_A_CORE FT
12.6   4.91  )-----------( 291      ( 2018 05:24 )             40.3         142  |  104  |  21  ----------------------------<  83  4.1   |  26  |  0.54    Ca    8.4      2018 05:24  Phos  4.4       Mg     2.3         TPro  6.5  /  Alb  3.8  /  TBili  0.3  /  DBili  x   /  AST  18  /  ALT  < 4<L>  /  AlkPhos  106      Urinalysis Basic - ( 2018 23:50 )    Color: YELLOW / Appearance: CLEAR / S.035 / pH: 6.0  Gluc: NEGATIVE / Ketone: TRACE  / Bili: NEGATIVE / Urobili: NORMAL mg/dL   Blood: TRACE / Protein: 30 mg/dL / Nitrite: NEGATIVE   Leuk Esterase: LARGE / RBC: 2-5 / WBC >50   Sq Epi: OCC / Non Sq Epi: x / Bacteria: FEW

## 2018-01-26 NOTE — PROGRESS NOTE ADULT - SUBJECTIVE AND OBJECTIVE BOX
Tu Damon MD  PGY-1 | Internal Medicine  [phone] / [phone]      Patient is a 57y old  Female who presents with a chief complaint of Weakness, tremor, Anxiety (2018 20:21)      SUBJECTIVE / OVERNIGHT EVENTS: Pt admitted overnight for worsening Parkinson's symptoms. Pt feels like body is shutting down and cannot move as much. Pt feels numb all over 2/2 not taking Sinemet earlier. Denies headache, dizziness, vision changes, n/v, CP, SOB, abd pain, diarrhea, dysuria, urinary frequency.     MEDICATIONS  (STANDING):  carbidopa/levodopa  25/100 1 Tablet(s) Oral four times a day  dextrose 5%. 1000 milliLiter(s) (50 mL/Hr) IV Continuous <Continuous>  dextrose 50% Injectable 12.5 Gram(s) IV Push once  dextrose 50% Injectable 25 Gram(s) IV Push once  dextrose 50% Injectable 25 Gram(s) IV Push once  enoxaparin Injectable 40 milliGRAM(s) SubCutaneous daily  insulin lispro (HumaLOG) corrective regimen sliding scale   SubCutaneous three times a day before meals  insulin lispro (HumaLOG) corrective regimen sliding scale   SubCutaneous at bedtime  lactobacillus acidophilus 1 Tablet(s) Oral every 12 hours  LORazepam     Tablet 1 milliGRAM(s) Oral two times a day  rOPINIRole 2 milliGRAM(s) Oral daily    MEDICATIONS  (PRN):  dextrose Gel 1 Dose(s) Oral once PRN Blood Glucose LESS THAN 70 milliGRAM(s)/deciliter  glucagon  Injectable 1 milliGRAM(s) IntraMuscular once PRN Glucose LESS THAN 70 milligrams/deciliter  melatonin 3 milliGRAM(s) Oral at bedtime PRN Insomnia      Vital Signs Last 24 Hrs  T(C): 36.7 (2018 06:25), Max: 37.1 (2018 15:39)  T(F): 98.1 (2018 06:25), Max: 98.8 (2018 15:39)  HR: 62 (2018 06:25) (62 - 94)  BP: 106/68 (2018 06:25) (106/68 - 168/102)  BP(mean): --  RR: 14 (2018 06:25) (14 - 18)  SpO2: 100% (2018 06:25) (98% - 100%)    CAPILLARY BLOOD GLUCOSE      POCT Blood Glucose.: 109 mg/dL (2018 08:57)  POCT Blood Glucose.: 90 mg/dL (2018 21:21)      I&O's Summary        PHYSICAL EXAM:  GENERAL: NAD, well-developed  HEAD:  NC, AT  EYES: EOMI, PERRL, conjunctiva and sclera clear  ENMT: Airway patent. MMM.  NECK: Supple, No JVD  HEART: RRR; Normal S1, S2. No murmurs, rubs, or gallops  CHEST/LUNG: CTABL; No wheezing, rhonchi, or rales  ABDOMEN: +BS; Soft, nondistended, nontender  EXTREMITIES:  2+ Peripheral Pulses, No clubbing, cyanosis, or edema  PSYCH: AAOx3  NEUROLOGY: non-focal  SKIN: Warm, dry, intact; No rashes or lesions      LABS:                        12.6   4.91  )-----------( 291      ( 2018 05:24 )             40.3         142  |  104  |  21  ----------------------------<  83  4.1   |  26  |  0.54    Ca    8.4      2018 05:24  Phos  4.4       Mg     2.3         TPro  6.5  /  Alb  3.8  /  TBili  0.3  /  DBili  x   /  AST  18  /  ALT  < 4<L>  /  AlkPhos  106      LIVER FUNCTIONS - ( 2018 05:24 )  Alb: 3.8 g/dL / Pro: 6.5 g/dL / ALK PHOS: 106 u/L / ALT: < 4 u/L / AST: 18 u/L / GGT: x           PT/INR - ( 2018 05:24 )   PT: 11.3 SEC;   INR: 1.02          PTT - ( 2018 05:24 )  PTT:30.1 SEC  CARDIAC MARKERS ( 2018 05:24 )  x     / x     / 74 u/L / x     / x          Urinalysis Basic - ( 2018 23:50 )    Color: YELLOW / Appearance: CLEAR / S.035 / pH: 6.0  Gluc: NEGATIVE / Ketone: TRACE  / Bili: NEGATIVE / Urobili: NORMAL mg/dL   Blood: TRACE / Protein: 30 mg/dL / Nitrite: NEGATIVE   Leuk Esterase: LARGE / RBC: 2-5 / WBC >50   Sq Epi: OCC / Non Sq Epi: x / Bacteria: FEW          RADIOLOGY & ADDITIONAL TESTS:    Imaging Personally Reviewed:     Consultant(s) Notes Reviewed:     Care Discussed with Consultants/Other Providers:

## 2018-01-26 NOTE — DISCHARGE NOTE ADULT - MEDICATION SUMMARY - MEDICATIONS TO TAKE
I will START or STAY ON the medications listed below when I get home from the hospital:    LORazepam 1 mg oral tablet  -- 1 tab(s) by mouth 1 to 2 times a day  -- Indication: For Anxiety    Sinemet 25 mg-100 mg oral tablet  -- 1 tab(s) by mouth 4 times a day  -- Indication: For Parkinson disease    Requip 2 mg oral tablet  -- 1 tab(s) by mouth once a day  -- Indication: For Parkinson disease    QUEtiapine 25 mg oral tablet  -- 1 tab(s) by mouth once a day (at bedtime)  -- Indication: For Paranoia/Anxiety    amantadine 100 mg oral capsule  -- 1 cap(s) by mouth once a day (at bedtime) for 1 weeks and then 1 cap(s) by mouth 2 times a day  -- Indication: For Parkinson disease    Multiple Vitamins oral tablet  -- 1 tab(s) by mouth once a day  -- Indication: For Supplement    Vitamin B6  -- 1 tab(s) by mouth once a day  -- Indication: For Supplement I will START or STAY ON the medications listed below when I get home from the hospital:    LORazepam 1 mg oral tablet  -- 1 tab(s) by mouth once a day, As Needed  -- Indication: For Anxiety    Sinemet 25 mg-100 mg oral tablet  -- 1 tab(s) by mouth 4 times a day  -- Indication: For Parkinson disease    Requip 2 mg oral tablet  -- 1 tab(s) by mouth once a day  -- Indication: For Parkinson disease    QUEtiapine 25 mg oral tablet  -- 1 tab(s) by mouth once a day (at bedtime)  -- Indication: For Paranoia/Anxiety    Multiple Vitamins oral tablet  -- 1 tab(s) by mouth once a day  -- Indication: For Supplement    Vitamin B6  -- 1 tab(s) by mouth once a day  -- Indication: For Supplement

## 2018-01-26 NOTE — DISCHARGE NOTE ADULT - VISION (WITH CORRECTIVE LENSES IF THE PATIENT USUALLY WEARS THEM):
Needs glasses/Normal vision: sees adequately in most situations; can see medication labels, newsprint

## 2018-01-26 NOTE — DISCHARGE NOTE ADULT - ADDITIONAL INSTRUCTIONS
Please follow up with Dr. Xavier (movement disorder specialist) at [address] [facility] [place] in [place] at [phone].  Please follow up with Dr. Donis within 1-2 weeks.

## 2018-01-26 NOTE — BEHAVIORAL HEALTH ASSESSMENT NOTE - RISK ASSESSMENT
Risk factors include acute and chronic medical issues.  Protective factors include stable domicile, social support, no past psych hosp or SA, no substance use, currently connected to care.  Pt denies SI/HI/AH/VH, manic or psychotic symptoms.  Pt does not present an acute risk of harm to self or others.  Pt does not require inpatient psychiatric hospitalization.

## 2018-01-26 NOTE — DISCHARGE NOTE ADULT - PLAN OF CARE
You were admitted with worsening movement symptoms. It is unclear whether you have Parkinson's disease or another disorder causing your symptoms. Please follow up with the movement specialist. To control symptoms You were admitted with worsening movement symptoms. It is unclear whether you have Parkinson's disease or another disorder causing your symptoms. Please follow up with Dr. Xavier (movement disorder specialist) at [address] [place] [address] in [place] at [phone]. Take your medications as prescribed. Do not take more medication than prescribed to ensure your body continues to respond to the medication. You have anxiety which can affect your mood, sleep, and movement symptoms. Take your medications as prescribed. Please follow up with Dr. Donis within 1-2 weeks to continue to optimize your medication. You were admitted with worsening movement symptoms. It is unclear whether you have Parkinson's disease or another disorder causing your symptoms. Please follow up with Dr. Xavier (movement disorder specialist) at [address] [place] [address] in [place] at [phone]. Take your medications as prescribed. Do not take more medication than prescribed.     Please also follow up with psychiatry as there may be a psychiatric component to your symptoms. Seroquel was started per their recommendations which aided in alleviation of your anxiety. Please continue to follow up with psychiatry upon discharge from the hospital.

## 2018-01-26 NOTE — PROGRESS NOTE ADULT - PROBLEM SELECTOR PLAN 3
pt was on Methimazole in the past.   - Controlled, TSH, free T4 wnl pt was on Methimazole in the past. Also, filled Levothyroxine 25 mcg Rx per [facility]  - Controlled, TSH, free T4 wnl

## 2018-01-26 NOTE — PROGRESS NOTE ADULT - ASSESSMENT
Pt is a 58 y/o female with PMhx of Parkinson's disease, Hyperthyroidism, Anxiety, Depression,  is here for evaluation  of worsening of parkinson's symptoms on Sinemet. Pt is a 56 y/o female with PMhx of Parkinson's disease?, Hyperthyroidism, Anxiety, Depression,  is here for evaluation of worsening of parkinson's symptoms on Sinemet.

## 2018-01-26 NOTE — DISCHARGE NOTE ADULT - CARE PLAN
Principal Discharge DX:	Movement disorder  Goal:	To control symptoms  Assessment and plan of treatment:	You were admitted with worsening movement symptoms. It is unclear whether you have Parkinson's disease or another disorder causing your symptoms. Please follow up with the movement specialist.  Secondary Diagnosis:	Anxiety  Secondary Diagnosis:	Depression (emotion) Principal Discharge DX:	Movement disorder  Goal:	To control symptoms  Assessment and plan of treatment:	You were admitted with worsening movement symptoms. It is unclear whether you have Parkinson's disease or another disorder causing your symptoms. Please follow up with Dr. Xavier (movement disorder specialist) at [address] [place] [address] in [place] at [phone]. Take your medications as prescribed. Do not take more medication than prescribed to ensure your body continues to respond to the medication.  Secondary Diagnosis:	Anxiety  Assessment and plan of treatment:	You have anxiety which can affect your mood, sleep, and movement symptoms. Take your medications as prescribed. Please follow up with Dr. Donis within 1-2 weeks to continue to optimize your medication.  Secondary Diagnosis:	Depression (emotion) Principal Discharge DX:	Movement disorder  Goal:	To control symptoms  Assessment and plan of treatment:	You were admitted with worsening movement symptoms. It is unclear whether you have Parkinson's disease or another disorder causing your symptoms. Please follow up with Dr. Xavier (movement disorder specialist) at [address] [place] [address] in [place] at [phone]. Take your medications as prescribed. Do not take more medication than prescribed.     Please also follow up with psychiatry as there may be a psychiatric component to your symptoms. Seroquel was started per their recommendations which aided in alleviation of your anxiety.  Secondary Diagnosis:	Anxiety  Assessment and plan of treatment:	You have anxiety which can affect your mood, sleep, and movement symptoms. Take your medications as prescribed. Please follow up with Dr. Donis within 1-2 weeks to continue to optimize your medication.  Secondary Diagnosis:	Depression (emotion)  Assessment and plan of treatment:	Please continue to follow up with psychiatry upon discharge from the hospital.

## 2018-01-26 NOTE — DISCHARGE NOTE ADULT - PROVIDER TOKENS
TOKEN:'[id number]' TOKEN:'[id number]:'[id number]' TOKEN:'[id number]:'[id number]:'[id number]'

## 2018-01-26 NOTE — BEHAVIORAL HEALTH ASSESSMENT NOTE - HPI (INCLUDE ILLNESS QUALITY, SEVERITY, DURATION, TIMING, CONTEXT, MODIFYING FACTORS, ASSOCIATED SIGNS AND SYMPTOMS)
Pt is a 56 yo  [place] female, unemployed, domiciled with family, with history of Sinemet dependence, currently seen at [facility] by Dr. Donis, no past psychiatric hospitalizations, no past suicide attempts, no other substance use, PMH ? Parkinson's disease, Hyperthyroid, is here for evaluation of worsening of parkinson's symptoms on Sinemet and Requip daily.  Pt was seen by neurology.  "Her exam does not reveal any tremor, bradykinesia, or rigidity or stiffness. Her gait is normal with normal arm swing."  Psych consulted for conversion disorder.      Seen and examined at bedside.  Pt AAOx4, superficially pleasant on interview.  She speaks softly with some impaired articulation.  Pt states she is doing well on Sinemet.  States she gets "frozen" and it is relieved by Sinemet.  After taking Sinemet, pt states she is able to garden and tend to her home.  Reports taking 3-4 tabs daily.  She is aware that doctor's have told her that she may not have Parkinson's.  Pt reports low mood at times in the context of medical issues.  Denies paranoia ideation related to her .  States she trusts him because she can see how he stands by her through her medical issues.  Denies SI/HI/AH/VH.  Pt reports Parkinsonian symptoms started 5-6 years ago.  Denies acute stressors during this time.  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  Pt is currently seen at [facility] by Dr. Donis.  She reports trialling a medication once (Risperdal 0.5mg) and "froze" for an entire day.  Reports throwing out this medication thereafter.  She reports taking Ativan 1mg qHS for sleep for months though  shows one prescription for Ativan 1mg BID in December 2017.  Pt reports possibly taking Seroquel in the past.  Pt denies past psychiatric hospitalizations, suicide attempts, or other substance use.      Spoke with pt's .  He reports pt spent $300/month on additional Sinemet pills in December and January.  States he is unsure of how much she is actually taking.  States pt's prescribed medications are managed by daughter.  States pt will cry for hours until daughter gives her Sinemet.  Daughter confirms that pt takes Ativan 1mg qHS.  He also reports pt is paranoid and calls him incessantly due to concerns of infidelity.  He is a  and receives many calls.  He reports pt has called clients and berated them on the phone.  He reports he had lost a significant amount of money in 2009 and pt developed paranoia thereafter in 2011.  States pt's paranoia precedes Parkinson's diagnosis and persists to today.  He also reports that he has observed pt talking to herself when she has taken a large amount of Sinemet.     Ref# [id number]

## 2018-01-26 NOTE — BEHAVIORAL HEALTH ASSESSMENT NOTE - NSBHCHARTREVIEWIMAGING_PSY_A_CORE FT
< from: CT Head No Cont (01.26.18 @ 09:22) >    IMPRESSION:    No acute intracranial hemorrhage or mass effect.    < end of copied text >

## 2018-01-26 NOTE — PROGRESS NOTE ADULT - PROBLEM SELECTOR PLAN 2
Pt with hx of depression and anxiety but denies.   - psych consult  - continue home ativan 1 mg BID PO Pt with hx of depression and anxiety but denies today. Possible conversion d/o with neurological symptoms.   - psych consult  - continue home ativan 1 mg BID PO

## 2018-01-26 NOTE — DISCHARGE NOTE ADULT - CARE PROVIDER_API CALL
Chalino Heck (MD; PhD), Neurology  [address] [place] [address]  Phone: [phone]  Fax: [phone] Chalino Heck (MD; PhD), Neurology  [address] [place] [address]  [address] [place] [address]  Phone: [phone]  Fax: [phone]    Elidia Donis (KAPIL), Psychiatry  [address] [place] [address]  Phone: [phone]  Fax: [phone] Chalino Heck (MD; PhD), Neurology  [address] [place] [address]  Phone: [phone]  Fax: [phone]    Elidia Donis (MBBS), Psychiatry  [address] [place] [address]  Phone: [phone]  Fax: [phone]    Luis Miguel Reddy (DO), Psychiatry  [address] [place] [address]  Phone: [phone]  Fax: [phone]

## 2018-01-26 NOTE — BEHAVIORAL HEALTH ASSESSMENT NOTE - NSBHCHARTREVIEWVS_PSY_A_CORE FT
Vital Signs Last 24 Hrs  T(C): 36.7 (26 Jan 2018 21:35), Max: 36.7 (26 Jan 2018 06:25)  T(F): 98 (26 Jan 2018 21:35), Max: 98.1 (26 Jan 2018 06:25)  HR: 100 (26 Jan 2018 21:35) (62 - 100)  BP: 145/88 (26 Jan 2018 21:35) (106/68 - 145/88)  BP(mean): --  RR: 18 (26 Jan 2018 21:35) (14 - 18)  SpO2: 98% (26 Jan 2018 21:35) (98% - 100%)

## 2018-01-27 VITALS
RESPIRATION RATE: 16 BRPM | DIASTOLIC BLOOD PRESSURE: 81 MMHG | TEMPERATURE: 97 F | HEART RATE: 68 BPM | OXYGEN SATURATION: 98 % | SYSTOLIC BLOOD PRESSURE: 127 MMHG

## 2018-01-27 LAB
BACTERIA UR CULT: SIGNIFICANT CHANGE UP
BACTERIA UR CULT: SIGNIFICANT CHANGE UP
BUN SERPL-MCNC: 22 MG/DL — SIGNIFICANT CHANGE UP (ref 7–23)
CALCIUM SERPL-MCNC: 8.9 MG/DL — SIGNIFICANT CHANGE UP (ref 8.4–10.5)
CHLORIDE SERPL-SCNC: 104 MMOL/L — SIGNIFICANT CHANGE UP (ref 98–107)
CO2 SERPL-SCNC: 26 MMOL/L — SIGNIFICANT CHANGE UP (ref 22–31)
CREAT SERPL-MCNC: 0.64 MG/DL — SIGNIFICANT CHANGE UP (ref 0.5–1.3)
GLUCOSE SERPL-MCNC: 94 MG/DL — SIGNIFICANT CHANGE UP (ref 70–99)
POTASSIUM SERPL-MCNC: 4 MMOL/L — SIGNIFICANT CHANGE UP (ref 3.5–5.3)
POTASSIUM SERPL-SCNC: 4 MMOL/L — SIGNIFICANT CHANGE UP (ref 3.5–5.3)
SODIUM SERPL-SCNC: 143 MMOL/L — SIGNIFICANT CHANGE UP (ref 135–145)
SPECIMEN SOURCE: SIGNIFICANT CHANGE UP
SPECIMEN SOURCE: SIGNIFICANT CHANGE UP

## 2018-01-27 PROCEDURE — 99221 1ST HOSP IP/OBS SF/LOW 40: CPT

## 2018-01-27 PROCEDURE — 99239 HOSP IP/OBS DSCHRG MGMT >30: CPT

## 2018-01-27 RX ORDER — QUETIAPINE FUMARATE 200 MG/1
1 TABLET, FILM COATED ORAL
Qty: 30 | Refills: 0 | OUTPATIENT
Start: 2018-01-27 | End: 2018-02-25

## 2018-01-27 RX ADMIN — ROPINIROLE 2 MILLIGRAM(S): 8 TABLET, FILM COATED, EXTENDED RELEASE ORAL at 11:32

## 2018-01-27 RX ADMIN — CARBIDOPA AND LEVODOPA 1 TABLET(S): 25; 100 TABLET ORAL at 07:22

## 2018-01-27 RX ADMIN — Medication 100 MILLIGRAM(S): at 06:13

## 2018-01-27 RX ADMIN — Medication 1 TABLET(S): at 06:13

## 2018-01-27 RX ADMIN — CARBIDOPA AND LEVODOPA 1 TABLET(S): 25; 100 TABLET ORAL at 11:32

## 2018-01-27 RX ADMIN — Medication 1 TABLET(S): at 11:32

## 2018-01-27 NOTE — PROGRESS NOTE ADULT - PROBLEM SELECTOR PLAN 1
-little evidence of Parkinson's disease on neuro exam.   -with c/w sinemet 20-100mg QID, requip 2 mg daily for now  -neuro recs appreciated, psych recs appreciated

## 2018-01-27 NOTE — PROGRESS NOTE ADULT - ASSESSMENT
57F with questionable history of Parkinson's disease, Hyperthyroidism, Anxiety, Depression possibly with conversion disorder and sinemet dependence.

## 2018-01-27 NOTE — PROGRESS NOTE ADULT - PROBLEM SELECTOR PLAN 2
-denies hx depression anxiety however on ativan prn for anxiety.   -with symptoms of paranoia  -concern for conversion disorder causing neurologic symptoms  -started seroquel 25 mg qhs yesterday; assess for improvement.   - continue home ativan 1 mg BID PO

## 2018-01-27 NOTE — PROGRESS NOTE ADULT - ATTENDING COMMENTS
Appreciate Neuro recs- c/w current regimen of Sinemet and Requip, f/u w/ movement disorder specialist OP.   Started on Seroquel QHS as per Psych.   Pt counseled on taking meds as prescribed.   Medically stable for d/c home today.
Appreciate Neuro recs- c/w current regimen of Sinemet and Requip, f/u w/ movement disorder specialist OP.   Pt counseled on taking meds as prescribed.   Psych consulted for possible conversion disorder, f/u recs.

## 2018-01-27 NOTE — PROGRESS NOTE ADULT - PROBLEM SELECTOR PLAN 4
DVT Prophylaxis: pt ambulates; IMPROVE score 0. no indication for pharmacological DVT ppx  Dispo: home with neurology f/u

## 2018-01-27 NOTE — PROGRESS NOTE ADULT - SUBJECTIVE AND OBJECTIVE BOX
Kenna Mendez MD   PGY 2  Pager [phone]  Page [phone] or [phone] after 7 pm       Patient is a 57y old  Female who presents with a chief complaint of Weakness, tremor, Anxiety (2018 18:00)      INTERVAL HPI/OVERNIGHT EVENTS: no overnight events. Yesterday met with psych and started on seroquel for paranoia/conversion disorder.         REVIEW OF SYSTEMS:  CONSTITUTIONAL: No fever, chills  ENMT:  No difficulty hearing, no change in vision  NECK: No pain or stiffness  RESPIRATORY: No cough, SOB  CARDIOVASCULAR: No chest pain, palpitations  GASTROINTESTINAL: No abdominal pain. No nausea, vomiting, or diarrhea  GENITOURINARY: No dysuria  NEUROLOGICAL: No HA  SKIN: No itching, burning, rashes, or lesions   LYMPH NODES: No enlarged glands  ENDOCRINE: No heat or cold intolerance; No hair loss  MUSCULOSKELETAL: No joint pain or swelling; No muscle, back, or extremity pain  PSYCHIATRIC: No depression, anxiety  HEME/LYMPH: No easy bruising, or bleeding gums    T(C): 36.3 (18 @ 06:12), Max: 36.7 (18 @ 21:35)  HR: 68 (18 @ 06:12) (68 - 100)  BP: 127/81 (18 @ 06:12) (113/63 - 145/88)  RR: 16 (18 @ 06:12) (16 - 18)  SpO2: 98% (18 @ 06:12) (98% - 99%)  Wt(kg): --Vital Signs Last 24 Hrs  T(C): 36.3 (2018 06:12), Max: 36.7 (2018 21:35)  T(F): 97.4 (2018 06:12), Max: 98 (2018 21:35)  HR: 68 (2018 06:12) (68 - 100)  BP: 127/81 (2018 06:12) (113/63 - 145/88)  BP(mean): --  RR: 16 (2018 06:12) (16 - 18)  SpO2: 98% (2018 06:12) (98% - 99%)    PHYSICAL EXAM:  GENERAL: NAD  EYES: clear conjunctiva; EOMI  ENMT: Moist mucous membranes  NECK: Supple, No JVD, Normal thyroid  CHEST/LUNG: Clear to auscultation bilaterally; No rales, rhonchi, wheezing, or rubs  HEART: S1, S2, Regular rate and rhythm  ABDOMEN: Soft, Nontender, Nondistended; Bowel sounds present  NEURO: Alert & Oriented X3, muscle rigidity, no tremor.   EXTREMITIES: No LE edema, no calf tenderness  LYMPH: No lymphadenopathy noted  SKIN: No rashes or lesions    Consultant(s) Notes Reviewed:  [x ] YES  [ ] NO  Care Discussed with Consultants/Other Providers [ x] YES  [ ] NO    LABS:                        12.6   4.91  )-----------( 291      ( 2018 05:24 )             40.3         143  |  104  |  22  ----------------------------<  94  4.0   |  26  |  0.64    Ca    8.9      2018 05:55  Phos  4.4       Mg     2.3         TPro  6.5  /  Alb  3.8  /  TBili  0.3  /  DBili  x   /  AST  18  /  ALT  < 4<L>  /  AlkPhos  106      PT/INR - ( 2018 05:24 )   PT: 11.3 SEC;   INR: 1.02          PTT - ( 2018 05:24 )  PTT:30.1 SEC  Urinalysis Basic - ( 2018 23:50 )    Color: YELLOW / Appearance: CLEAR / S.035 / pH: 6.0  Gluc: NEGATIVE / Ketone: TRACE  / Bili: NEGATIVE / Urobili: NORMAL mg/dL   Blood: TRACE / Protein: 30 mg/dL / Nitrite: NEGATIVE   Leuk Esterase: LARGE / RBC: 2-5 / WBC >50   Sq Epi: OCC / Non Sq Epi: x / Bacteria: FEW      CAPILLARY BLOOD GLUCOSE      POCT Blood Glucose.: 92 mg/dL (2018 22:20)  POCT Blood Glucose.: 117 mg/dL (2018 17:40)  POCT Blood Glucose.: 127 mg/dL (2018 12:19)  POCT Blood Glucose.: 109 mg/dL (2018 08:57)        Urinalysis Basic - ( 2018 23:50 )    Color: YELLOW / Appearance: CLEAR / S.035 / pH: 6.0  Gluc: NEGATIVE / Ketone: TRACE  / Bili: NEGATIVE / Urobili: NORMAL mg/dL   Blood: TRACE / Protein: 30 mg/dL / Nitrite: NEGATIVE   Leuk Esterase: LARGE / RBC: 2-5 / WBC >50   Sq Epi: OCC / Non Sq Epi: x / Bacteria: FEW        RADIOLOGY & ADDITIONAL TESTS:    Imaging Personally Reviewed:  [ ] YES  [ ] NO

## 2018-01-27 NOTE — PROGRESS NOTE ADULT - PROBLEM SELECTOR PLAN 3
-pt was on Methimazole in the past. Also, filled Levothyroxine 25 mcg Rx per [facility]  - Controlled, TSH, free T4 wnl; hold medications for now.

## 2018-01-29 DIAGNOSIS — R69 ILLNESS, UNSPECIFIED: ICD-10-CM

## 2018-02-02 PROBLEM — G20 PARKINSON'S DISEASE: Chronic | Status: ACTIVE | Noted: 2018-01-25

## 2018-02-12 ENCOUNTER — EMERGENCY (EMERGENCY)
Facility: HOSPITAL | Age: 58
LOS: 1 days | Discharge: HOME | End: 2018-02-12
Admitting: EMERGENCY MEDICINE
Payer: MEDICAID

## 2018-02-12 VITALS
OXYGEN SATURATION: 100 % | RESPIRATION RATE: 18 BRPM | TEMPERATURE: 98 F | DIASTOLIC BLOOD PRESSURE: 76 MMHG | SYSTOLIC BLOOD PRESSURE: 137 MMHG | HEART RATE: 80 BPM

## 2018-02-12 DIAGNOSIS — R69 ILLNESS, UNSPECIFIED: ICD-10-CM

## 2018-02-12 DIAGNOSIS — F43.24 ADJUSTMENT DISORDER WITH DISTURBANCE OF CONDUCT: ICD-10-CM

## 2018-02-12 DIAGNOSIS — F19.20 OTHER PSYCHOACTIVE SUBSTANCE DEPENDENCE, UNCOMPLICATED: ICD-10-CM

## 2018-02-12 PROCEDURE — 99285 EMERGENCY DEPT VISIT HI MDM: CPT

## 2018-02-12 PROCEDURE — 90792 PSYCH DIAG EVAL W/MED SRVCS: CPT

## 2018-02-12 RX ORDER — CARBIDOPA AND LEVODOPA 25; 100 MG/1; MG/1
1 TABLET ORAL ONCE
Qty: 0 | Refills: 0 | Status: COMPLETED | OUTPATIENT
Start: 2018-02-12 | End: 2018-02-12

## 2018-02-12 RX ORDER — ROPINIROLE 8 MG/1
2 TABLET, FILM COATED, EXTENDED RELEASE ORAL ONCE
Qty: 0 | Refills: 0 | Status: COMPLETED | OUTPATIENT
Start: 2018-02-12 | End: 2018-02-12

## 2018-02-12 RX ADMIN — CARBIDOPA AND LEVODOPA 1 TABLET(S): 25; 100 TABLET ORAL at 14:47

## 2018-02-12 RX ADMIN — ROPINIROLE 2 MILLIGRAM(S): 8 TABLET, FILM COATED, EXTENDED RELEASE ORAL at 15:28

## 2018-02-12 NOTE — ED PROVIDER NOTE - CARE PLAN
Principal Discharge DX:	Adjustment disorder with disturbance of conduct  Secondary Diagnosis:	Drug dependence

## 2018-02-12 NOTE — ED BEHAVIORAL HEALTH ASSESSMENT NOTE - HPI (INCLUDE ILLNESS QUALITY, SEVERITY, DURATION, TIMING, CONTEXT, MODIFYING FACTORS, ASSOCIATED SIGNS AND SYMPTOMS)
Pt is a 56 yo  Sammarinese female, unemployed, domiciled with family, with questionable history of Parkinsons disease currently on Sinemet, with Sinemet dependence, currently seen at [facility] by Dr. Donis, no past psychiatric hospitalizations, no past suicide attempts, no other substance use, PMH ??Parkinson's disease, Hyperthyroid, brought in by daughter, Korina, for "detox" from Sinemet.      is here for evaluation of worsening of parkinson's symptoms on Sinemet and Requip daily.  Pt was seen by neurology.  "Her exam does not reveal any tremor, bradykinesia, or rigidity or stiffness. Her gait is normal with normal arm swing."  Psych consulted for conversion disorder.      Seen and examined at bedside.  Pt AAOx4, superficially pleasant on interview.  She speaks softly with some impaired articulation.  Pt states she is doing well on Sinemet.  States she gets "frozen" and it is relieved by Sinemet.  After taking Sinemet, pt states she is able to garden and tend to her home.  Reports taking 3-4 tabs daily.  She is aware that doctor's have told her that she may not have Parkinson's.  Pt reports low mood at times in the context of medical issues.  Denies paranoia ideation related to her .  States she trusts him because she can see how he stands by her through her medical issues.  Denies SI/HI/AH/VH.  Pt reports Parkinsonian symptoms started 5-6 years ago.  Denies acute stressors during this time.  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  Pt is currently seen at [facility] by Dr. Donis.  She reports trialling a medication once (Risperdal 0.5mg) and "froze" for an entire day.  Reports throwing out this medication thereafter.  She reports taking Ativan 1mg qHS for sleep for months though  shows one prescription for Ativan 1mg BID in December 2017.  Pt reports possibly taking Seroquel in the past.  Pt denies past psychiatric hospitalizations, suicide attempts, or other substance use.      Spoke with pt's .  He reports pt spent $300/month on additional Sinemet pills in December and January.  States he is unsure of how much she is actually taking.  States pt's prescribed medications are managed by daughter.  States pt will cry for hours until daughter gives her Sinemet.  Daughter confirms that pt takes Ativan 1mg qHS.  He also reports pt is paranoid and calls him incessantly due to concerns of infidelity.  He is a  and receives many calls.  He reports pt has called clients and berated them on the phone.  He reports he had lost a significant amount of money in 2009 and pt developed paranoia thereafter in 2011.  States pt's paranoia precedes Parkinson's diagnosis and persists to today.  He also reports that he has observed pt talking to herself when she has taken a large amount of Sinemet.     Ref# [id number] Pt is a 58 yo  [place] citizen female, unemployed, domiciled with family, with questionable history of Parkinsons disease currently on Sinemet, with Sinemet dependence, currently seen at [facility] by Dr. Donis, no past psychiatric hospitalizations, no past suicide attempts, no other substance use, PMH ??Parkinson's disease, Hyperthyroid, brought in by daughter, Korina, for "detox" from Sinemet.     Of note, pt was hospitalized at [facility] 1/25-1/27/18 for evaluation of worsening of possible Parkinson's symptoms. Pt was seen by neurology who noted pt likely does not have Parkinson's Disease. She was referred to outpatient Movement Disorder specialist for follow-up. During this hospitalization, psychiatry was consulted for evaluation of conversion disorder. Psych CL team indicated pt's presentation is possibly related to conversion disorder. Seroquel 25 mg po QHS was recommended for paranoia reported by pt's family. Pt is not taking this medication.     Collateral obtained from pt's daughter, Korina. States pt has been reporting episodes of rigidity and burning/numbness in her legs for about 8 years. States pt has episodes where she says her body is "frozen" and pt claims she can't move. Pt has seen various neurologists, many of whom believe pt does not have Parkinson's Disease. However, pt firmly believes she has Parkinson's Disease, and she continues to obtain Sinemet from various MD's. She often takes more than prescribed, stating she needs the medication in order to function. [facility] states pt is addicted, and she is concerned pt is doing harm to her body by taking all of this Sinemet. Informant reports pt is more paranoid and suspicious about 's possible infidelity when she takes Sinemet.  Informant states pt often can't focus, as she "fixates" on getting her Sinemet. Over the years, and most recently two days ago, pt has stated she should kill herself when family refuses to give pt her Sinemet. Pt has no history of self-injurious behavior, no history of suicide attempts. When family won't give her Sinemet, pt gets angry and has "swung" at [facility]. Pt has not actually been physically aggressive or violent. Pt also throws things across the room sometimes when she demands her Sinemet.   [facility] wants pt admitted to the hospital for "detox" from Sinemet, as well as further neurological workup to ascertain the cause of pt's reported neurological symptoms.     Writer also spoke with pt's outpatient psychiatrist (Dr. Donis) from [facility]. States he and Katharina Davey, PhD follow pt for her Sinemet dependence. He feels pt may also be mildly depressed. He has prescribed Risperdal 0.5 mg po BID for paranoid ideation likely related to Sinemet. Pt tried it once or twice but discontinued due to sedation. States he referred pt to hospital today for voluntary psychiatric admission to help detox pt from Sinemet. States he was under the impression that pt was amenable to voluntary psychiatric admission. When writer informed him that pt is NOT amenable to voluntary psychiatric admission, he expressed disappointment but agreed to an outpatient appointment with pt this week.     On assessment, pt States she gets "frozen" and it is relieved by Sinemet.  After taking Sinemet, pt states she is able to garden and tend to her home.  Reports taking 3-4 tabs daily.  She is aware that doctor's have told her that she may not have Parkinson's.  Pt reports low mood at times in the context of medical issues.  Denies paranoia ideation related to her .  States she trusts him because she can see how he stands by her through her medical issues.  Denies SI/HI/AH/VH.  Pt reports Parkinsonian symptoms started 5-6 years ago.  Denies acute stressors during this time.  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  Pt is currently seen at [facility] by Dr. Donis.  She reports trialling a medication once (Risperdal 0.5mg) and "froze" for an entire day.  Reports throwing out this medication thereafter.  She reports taking Ativan 1mg qHS for sleep for months though  shows one prescription for Ativan 1mg BID in December 2017.  Pt reports possibly taking Seroquel in the past.  Pt denies past psychiatric hospitalizations, suicide attempts, or other substance use.        [facility] Ref# [id number] Pt is a 56 yo  Mongolian female, unemployed, domiciled with family, with questionable history of Parkinsons disease currently on Sinemet, with Sinemet dependence, currently seen at [facility] by Dr. Donis, no past psychiatric hospitalizations, no past suicide attempts, no other substance use, PMH ??Parkinson's disease, Hyperthyroid, brought in by daughter, Korina, for "detox" from Sinemet.     Of note, pt was hospitalized at [facility] 1/25-1/27/18 for evaluation of worsening of possible Parkinson's symptoms. Pt was seen by neurology who noted pt likely does not have Parkinson's Disease. She was referred to outpatient Movement Disorder specialist for follow-up. During this hospitalization, psychiatry was consulted for evaluation of conversion disorder. Psych CL team indicated pt's presentation is possibly related to conversion disorder. Seroquel 25 mg po QHS was recommended for paranoia reported by pt's family. Pt is not taking this medication.     Collateral obtained from pt's daughter, Korina. States pt has been reporting episodes of rigidity and burning/numbness in her legs for about 8 years. States pt has episodes where she says her body is "frozen" and pt claims she can't move. Pt has seen various neurologists, many of whom believe pt does not have Parkinson's Disease. However, pt firmly believes she has Parkinson's Disease, and she continues to obtain Sinemet from various MD's. She often takes more than prescribed, stating she needs the medication in order to function. [facility] states pt is addicted, and she is concerned pt is doing harm to her body by taking all of this Sinemet. Informant reports pt is more paranoid and suspicious about 's possible infidelity when she takes Sinemet.  Informant states pt often can't focus, as she "fixates" on getting her Sinemet. Over the years, and most recently two days ago, pt has stated she should kill herself when family refuses to give pt her Sinemet. Pt has no history of self-injurious behavior, no history of suicide attempts. When family won't give her Sinemet, pt gets angry and has "swung" at [facility]. Pt has not actually been physically aggressive or violent. Pt also throws things across the room sometimes when she demands her Sinemet.   Korina wants pt admitted to the hospital for "detox" from Sinemet, as well as further neurological workup to ascertain the cause of pt's reported neurological symptoms.     Writer also spoke with pt's outpatient psychiatrist (Dr. Donis) from [facility]. States he and Katharina Davey, PhD follow pt for her Sinemet dependence. He feels pt may also be mildly depressed. He has prescribed Risperdal 0.5 mg po BID for paranoid ideation likely related to Sinemet. Pt tried it once or twice but discontinued due to sedation. States he referred pt to hospital today for voluntary psychiatric admission to help detox pt from Sinemet. States he was under the impression that pt was amenable to voluntary psychiatric admission. When writer informed him that pt is NOT amenable to voluntary psychiatric admission, he expressed disappointment but agreed to an outpatient appointment with pt this week.     On assessment, pt states she has episodes where she is "numb from head to toe" and her body "freezes." She strongly believes she has Parkinson's Disease and needs to take Sinemet. She reports taking 4 tabs daily but admits to sometimes taking "1 or 2 extra" when her symptoms are more severe. She reports low mood at times in the context of medical issues, but she denies persistently depressed mood. She reports fair sleep and appetite. She denies anhedonia, denies hopelessness. She states it is possible she expressed thoughts of hurting herself in the context of feeling physically unwell and feeling she needs her Sinemet. However, patient adamantly denies suicidal ideation, intent, or plan, stating "I have 2 children, I would never hurt myself." She denies homicidal or violent ideation, intent, or plan. She denies paranoid ideation/AH/VH. She denies manic symptoms. Pt denies alcohol or illicit drug use.

## 2018-02-12 NOTE — ED BEHAVIORAL HEALTH ASSESSMENT NOTE - SUICIDE RISK FACTORS
Access to means (pills, firearms, etc.)/Chronic pain or acute medical issue/Substance abuse/dependence

## 2018-02-12 NOTE — ED PROVIDER NOTE - OBJECTIVE STATEMENT
The patient is a 57y Female hx of anxiety, depression and parkinson as per chart review brought to ed for psych eval 2/2 making suicidal statements two days ago.  As per her daughter- Korina The patient is a 57y Female hx of anxiety, depression and parkinson as per chart review brought to ed for psych eval 2/2 making suicidal statements two days ago.  As per her daughter- Korina (), pt made suicidal statement two days ago because she was not getting her parkinson's medication.  Pt stated that "she wished she was dead" as per her daughter.  Pt currently denies feeling suicidal and denies HI, no AVH.  Pt denies all medical complaints at present.  Denies cp or sob, no headache, nausea or vomiting, no fever or chills, no etoh or illicit drugs.  As per her daughter, pt is due for carbidopa/levodopa and requip at 3 PM.  Endorsed no other symptoms.

## 2018-02-12 NOTE — ED BEHAVIORAL HEALTH ASSESSMENT NOTE - RISK ASSESSMENT
Risk factors include acute and chronic medical issues.  Protective factors include stable domicile, social support, no past psych hosp or SA, no substance use, currently connected to care.  Pt denies SI/HI/AH/VH, manic or psychotic symptoms.  Pt does not present an acute risk of harm to self or others.  Pt does not require inpatient psychiatric hospitalization. Risk factors include acute and chronic medical issues.  Protective factors include stable domicile, social support, no past psych hosp or SA, no alcohol or illicit drug use, currently connected to care.  Pt denies SI/HI/AH/VH, manic or psychotic symptoms.  Pt does not present an acute risk of harm to self or others.

## 2018-02-12 NOTE — ED PROVIDER NOTE - MEDICAL DECISION MAKING DETAILS
56y/o Female hx of anxiety, depression and parkinson as per chart review brought to ed for psych eval 2/2 making suicidal statements two days ago.  Pt is calm, cooperative, alert and oriented, ambulating w/ steady gait, extremities w/ equal strength-  Psych consulted-

## 2018-02-12 NOTE — ED BEHAVIORAL HEALTH ASSESSMENT NOTE - SUMMARY
Pt is a 58 yo  [place]ian female, unemployed, domiciled with family, with questionable history of Parkinsons disease currently on Sinemet, with Sinemet dependence, currently seen at [facility] by Dr. Donis, no past psychiatric hospitalizations, no past suicide attempts, no other substance use, PMH ??Parkinson's disease, Hyperthyroid, brought in by daughter, Korina, for "detox" from Sinemet.      is here for evaluation of worsening of parkinson's symptoms on Sinemet and Requip daily.  Pt was seen by neurology.  "Her exam does not reveal any tremor, bradykinesia, or rigidity or stiffness. Her gait is normal with normal arm swing."  Psych consulted for conversion disorder.      On exam, pt AAOx4.  She reports rigidity and Parkinsonian symptoms which are relieved by Sinemet.  Clinical exam by neurology does not suggest Parkinson's.  Presentation possibly r/t conversion disorder.  Pt also presents with paranoia.  Recommend starting Seroquel 25mg qHS.  Pt may be more receptive to this over other antipsychotics as it is safely used in Parkinson's.  Consider discontinuing Ativan 1mg qHS which pt uses primarily for sleep.  PRNs as below.  Monitor EKG for QTC.  Will follow. Pt is a 58 yo  [place] female, unemployed, domiciled with family, with questionable history of Parkinsons disease currently on Sinemet, with Sinemet dependence, currently seen at [facility] by Dr. Donis, no past psychiatric hospitalizations, no past suicide attempts, no other substance use, PMH ??Parkinson's disease, Hyperthyroid, brought in by daughter, Korina, for "detox" from Sinemet. Pt is a 56 yo  [place] female, unemployed, domiciled with family, with questionable history of Parkinsons disease currently on Sinemet, with Sinemet dependence, currently seen at [facility] by Dr. Donis, no past psychiatric hospitalizations, no past suicide attempts, no other substance use, PMH ??Parkinson's disease, Hyperthyroid, brought in by daughter, Korina, for "detox" from Sinemet.    Patient declines voluntary psychiatric admission. Pt adamantly denies suicidal ideation, intent, or plan. She expresses desire to get better. She denies homicidal or violent ideation, intent, or plan. She is in good behavioral control throughout ED course. She does not appear manic, psychotic, or acutely depressed.  Pt does not present an imminent danger to self or others, and she does not meet criteria for involuntary psychiatric hospitalization at this time.

## 2018-02-12 NOTE — ED BEHAVIORAL HEALTH ASSESSMENT NOTE - DETAILS
Per CVM, slurring in speech, numbness in feet, anxiety from Risperdal tremors, stiffness left voicemail message for Dr. Donis informing him of discharge sedation from Risperdal rigidity, numbness

## 2018-02-12 NOTE — ED PROVIDER NOTE - PROGRESS NOTE DETAILS
NP- Matilde- 18:38-  Post discharge,  Carlota Amaral spoke with pt's daughter Korina who expressed frustration and discontent with plan to discharge her mother.  As per - pt stated that ED staff was rude to her.  I personally spoke with pt's daughter at length multiple times while she put me on hold until she was able to contact the staff at [facility] to locate her mother's psychiatrist.   I provided her with my number to have her mother's psychiatrist call me after holding for more than 5 minutes.  I received call back from Dr. Proctor who requested for psych eval.  Pt was evaluated and cleared by psych for d/c. Upon discharge, pt's daughter was informed that her mother was medicated with her 3PM parkinson meds (requip and carbidopa/levodopa).

## 2018-02-12 NOTE — ED ADULT TRIAGE NOTE - CHIEF COMPLAINT QUOTE
Hx of anxiety and depression, on Lorazepam and Seroquel but daughter states it is not helping her. Pt has been making suicidal threats recently, stating " I just want to kill myself". In past has overdosed on Carbidopa/ levodopa and Ropinirole, because daughter states "she is addicted to them". Denies drug or alcohol use.

## 2018-02-12 NOTE — ED BEHAVIORAL HEALTH NOTE - BEHAVIORAL HEALTH NOTE
Worker spoke with Patient's daughter Korina and patient's second daughter regarding patient discharge. Worker provided outpatient substance programs for follow up and also provided support about patient's discharge. Ms. Hui states that she had issues concerning her mother's care and mother's discharge and she stated that she did not understand why patient was not allowed to be inpatient detox. Worker provided support for patient and patient's daughter regarding concerns but worker also explained that previously that patient declined to do inpatient detox when she was seen in the psychiatric emergency room. Worker encouraged patient's daughters that if she feels that patient is worsening or a safety issue for her to activate 911 or bring patient back to the emergency room. Worker also encouraged patient to follow up at her neurologist about patient's ongoing treatment. Worker spoke with Patient's daughter Korina and patient's second daughter regarding patient discharge. Worker provided outpatient substance programs for follow up and also provided support about patient's recent discharge. Ms. Hui states that she had issues concerning her mother's care and mother's discharge and she stated that she did not understand why patient was not allowed to be inpatient detox. Worker provided support for patient and patient's daughter regarding concerns but worker also explained that previously that patient declined to do inpatient detox when she was seen in the psychiatric emergency room.  Patient's daughter Korina also stated that they had decline inpatient treatment when the patient was seen in the emergency room at that time. Worker encouraged patient's daughters that if she feels that patient is worsening or a safety issue for her to activate 911 or bring patient back to the emergency room. Worker also encouraged patient to follow up at her neurologist about patient's ongoing treatment.

## 2018-02-12 NOTE — ED ADULT NURSE REASSESSMENT NOTE - NS ED NURSE REASSESS COMMENT FT1
Patient in improved and stable condition, discharged as per MD Araujo order, discharge instructions given, pt verbalized understanding and left ER a&ox3.

## 2018-02-12 NOTE — ED PROVIDER NOTE - CHPI ED SYMPTOMS NEG
no hallucinations/no suicidal/no disorientation/no homicidal/no paranoia/no weakness/no weight loss/no agitation/no change in level of consciousness/no confusion

## 2018-06-01 PROCEDURE — G9005: CPT

## 2018-06-25 ENCOUNTER — APPOINTMENT (OUTPATIENT)
Dept: CARDIOLOGY | Facility: CLINIC | Age: 58
End: 2018-06-25

## 2018-07-01 ENCOUNTER — OUTPATIENT (OUTPATIENT)
Dept: OUTPATIENT SERVICES | Facility: HOSPITAL | Age: 58
LOS: 1 days | End: 2018-07-01
Payer: MEDICAID

## 2018-07-03 ENCOUNTER — APPOINTMENT (OUTPATIENT)
Dept: NEUROLOGY | Facility: CLINIC | Age: 58
End: 2018-07-03

## 2018-07-11 ENCOUNTER — LABORATORY RESULT (OUTPATIENT)
Age: 58
End: 2018-07-11

## 2018-07-11 ENCOUNTER — APPOINTMENT (OUTPATIENT)
Dept: RHEUMATOLOGY | Facility: CLINIC | Age: 58
End: 2018-07-11
Payer: MEDICAID

## 2018-07-11 VITALS
WEIGHT: 142 LBS | DIASTOLIC BLOOD PRESSURE: 83 MMHG | HEART RATE: 82 BPM | SYSTOLIC BLOOD PRESSURE: 145 MMHG | OXYGEN SATURATION: 98 % | BODY MASS INDEX: 25.16 KG/M2 | TEMPERATURE: 97.8 F | HEIGHT: 63 IN

## 2018-07-11 DIAGNOSIS — Z84.0 FAMILY HISTORY OF DISEASES OF THE SKIN AND SUBCUTANEOUS TISSUE: ICD-10-CM

## 2018-07-11 DIAGNOSIS — M77.02 MEDIAL EPICONDYLITIS, LEFT ELBOW: ICD-10-CM

## 2018-07-11 DIAGNOSIS — R20.0 ANESTHESIA OF SKIN: ICD-10-CM

## 2018-07-11 DIAGNOSIS — Z71.89 OTHER SPECIFIED COUNSELING: ICD-10-CM

## 2018-07-11 PROCEDURE — 99203 OFFICE O/P NEW LOW 30 MIN: CPT

## 2018-07-11 RX ORDER — METHIMAZOLE 5 MG/1
5 TABLET ORAL
Qty: 30 | Refills: 0 | Status: ACTIVE | COMMUNITY
Start: 2017-11-01

## 2018-07-11 RX ORDER — QUETIAPINE FUMARATE 25 MG/1
25 TABLET ORAL
Qty: 30 | Refills: 0 | Status: DISCONTINUED | COMMUNITY
Start: 2018-01-27 | End: 2018-07-11

## 2018-07-11 RX ORDER — RISPERIDONE 0.5 MG/1
0.5 TABLET, FILM COATED ORAL
Qty: 60 | Refills: 0 | Status: DISCONTINUED | COMMUNITY
Start: 2018-01-10 | End: 2018-07-11

## 2018-07-11 RX ORDER — AMANTADINE HYDROCHLORIDE 100 1/1
100 TABLET ORAL
Qty: 60 | Refills: 0 | Status: DISCONTINUED | COMMUNITY
Start: 2018-01-16 | End: 2018-07-11

## 2018-07-11 RX ORDER — GABAPENTIN 100 MG/1
100 CAPSULE ORAL
Qty: 90 | Refills: 0 | Status: DISCONTINUED | COMMUNITY
Start: 2018-03-02 | End: 2018-07-11

## 2018-07-13 DIAGNOSIS — R79.89 OTHER SPECIFIED ABNORMAL FINDINGS OF BLOOD CHEMISTRY: ICD-10-CM

## 2018-07-13 LAB
25(OH)D3 SERPL-MCNC: 19.1 NG/ML
ACE BLD-CCNC: 38 U/L
ADJUSTED MITOGEN: 9.48 IU/ML
ADJUSTED TB AG: -0.01 IU/ML
ALBUMIN MFR SERPL ELPH: 56.9 %
ALBUMIN SERPL ELPH-MCNC: 3.9 G/DL
ALBUMIN SERPL-MCNC: 3.9 G/DL
ALBUMIN/GLOB SERPL: 1.3 RATIO
ALP BLD-CCNC: 124 U/L
ALPHA1 GLOB MFR SERPL ELPH: 4.1 %
ALPHA1 GLOB SERPL ELPH-MCNC: 0.3 G/DL
ALPHA2 GLOB MFR SERPL ELPH: 10.7 %
ALPHA2 GLOB SERPL ELPH-MCNC: 0.7 G/DL
ALT SERPL-CCNC: 9 U/L
ANA SER IF-ACNC: NORMAL
ANION GAP SERPL CALC-SCNC: 11 MMOL/L
AST SERPL-CCNC: 21 U/L
B-GLOBULIN MFR SERPL ELPH: 11.7 %
B-GLOBULIN SERPL ELPH-MCNC: 0.8 G/DL
BASOPHILS # BLD AUTO: 0.01 K/UL
BASOPHILS NFR BLD AUTO: 0.2 %
BILIRUB SERPL-MCNC: <0.2 MG/DL
BUN SERPL-MCNC: 17 MG/DL
CALCIUM SERPL-MCNC: 8.9 MG/DL
CCP AB SER IA-ACNC: <8 UNITS
CD16+CD56+ CELLS # BLD: 260 /UL
CD16+CD56+ CELLS NFR BLD: 17 %
CD19 CELLS NFR BLD: 149 /UL
CD3 CELLS # BLD: 1168 /UL
CD3 CELLS NFR BLD: 73 %
CD3+CD4+ CELLS # BLD: 896 /UL
CD3+CD4+ CELLS NFR BLD: 55 %
CD3+CD4+ CELLS/CD3+CD8+ CLL SPEC: 3.27 RATIO
CD3+CD8+ CELLS # SPEC: 274 /UL
CD3+CD8+ CELLS NFR BLD: 17 %
CELLS.CD3-CD19+/CELLS IN BLOOD: 9 %
CHLORIDE SERPL-SCNC: 104 MMOL/L
CK SERPL-CCNC: 114 U/L
CO2 SERPL-SCNC: 27 MMOL/L
CREAT SERPL-MCNC: 0.59 MG/DL
CREAT SPEC-SCNC: 117 MG/DL
CREAT/PROT UR: 0.2 RATIO
CRP SERPL-MCNC: 0.19 MG/DL
DEPRECATED KAPPA LC FREE/LAMBDA SER: 1.19 RATIO
DSDNA AB SER-ACNC: <12 IU/ML
ENA JO1 AB SER IA-ACNC: <0.2 AL
ENA RNP AB SER IA-ACNC: <0.2 AL
ENA SM AB SER IA-ACNC: <0.2 AL
ENA SS-A AB SER IA-ACNC: <0.2 AL
ENA SS-B AB SER IA-ACNC: <0.2 AL
EOSINOPHIL # BLD AUTO: 0.04 K/UL
EOSINOPHIL NFR BLD AUTO: 0.7 %
ERYTHROCYTE [SEDIMENTATION RATE] IN BLOOD BY WESTERGREN METHOD: 29 MM/HR
FERRITIN SERPL-MCNC: 225 NG/ML
GAMMA GLOB FLD ELPH-MCNC: 1.1 G/DL
GAMMA GLOB MFR SERPL ELPH: 16.6 %
GLUCOSE SERPL-MCNC: 99 MG/DL
HCT VFR BLD CALC: 38.1 %
HGB BLD-MCNC: 11.9 G/DL
IGA 24H UR QL IFE: NORMAL
IGA SER QL IEP: 303 MG/DL
IGG SER QL IEP: 1290 MG/DL
IGM SER QL IEP: 85 MG/DL
IMM GRANULOCYTES NFR BLD AUTO: 0 %
INTERPRETATION SERPL IEP-IMP: NORMAL
KAPPA LC CSF-MCNC: 1.02 MG/DL
KAPPA LC SERPL-MCNC: 1.21 MG/DL
LYMPHOCYTES # BLD AUTO: 1.52 K/UL
LYMPHOCYTES NFR BLD AUTO: 28.3 %
M PROTEIN SPEC IFE-MCNC: NORMAL
M TB IFN-G BLD-IMP: NEGATIVE
MAN DIFF?: NORMAL
MCHC RBC-ENTMCNC: 28.6 PG
MCHC RBC-ENTMCNC: 31.2 GM/DL
MCV RBC AUTO: 91.6 FL
MONOCYTES # BLD AUTO: 0.37 K/UL
MONOCYTES NFR BLD AUTO: 6.9 %
MPO AB + PR3 PNL SER: NORMAL
NEUTROPHILS # BLD AUTO: 3.43 K/UL
NEUTROPHILS NFR BLD AUTO: 63.9 %
PLATELET # BLD AUTO: 329 K/UL
POTASSIUM SERPL-SCNC: 4.3 MMOL/L
PROT SERPL-MCNC: 6.8 G/DL
PROT UR-MCNC: 24 MG/DL
QUANTIFERON GOLD NIL: 0.03 IU/ML
RBC # BLD: 4.16 M/UL
RBC # FLD: 13.9 %
RF+CCP IGG SER-IMP: NEGATIVE
RHEUMATOID FACT SER QL: 10 IU/ML
SODIUM SERPL-SCNC: 142 MMOL/L
THYROGLOB AB SERPL-ACNC: <20 IU/ML
THYROPEROXIDASE AB SERPL IA-ACNC: 22.1 IU/ML
WBC # FLD AUTO: 5.37 K/UL

## 2018-08-06 PROBLEM — E05.90 THYROTOXICOSIS, UNSPECIFIED WITHOUT THYROTOXIC CRISIS OR STORM: Chronic | Status: ACTIVE | Noted: 2018-01-25

## 2018-08-06 PROBLEM — F41.9 ANXIETY DISORDER, UNSPECIFIED: Chronic | Status: ACTIVE | Noted: 2018-01-25

## 2018-08-06 PROBLEM — F32.9 MAJOR DEPRESSIVE DISORDER, SINGLE EPISODE, UNSPECIFIED: Chronic | Status: ACTIVE | Noted: 2018-01-25

## 2018-08-06 PROBLEM — E11.9 TYPE 2 DIABETES MELLITUS WITHOUT COMPLICATIONS: Chronic | Status: ACTIVE | Noted: 2018-01-25

## 2018-08-09 ENCOUNTER — APPOINTMENT (OUTPATIENT)
Dept: NEUROLOGY | Facility: CLINIC | Age: 58
End: 2018-08-09

## 2018-08-10 ENCOUNTER — APPOINTMENT (OUTPATIENT)
Dept: RHEUMATOLOGY | Facility: CLINIC | Age: 58
End: 2018-08-10

## 2018-09-01 PROCEDURE — G9005: CPT

## 2018-10-30 ENCOUNTER — APPOINTMENT (OUTPATIENT)
Dept: NEUROLOGY | Facility: CLINIC | Age: 58
End: 2018-10-30

## 2019-03-28 NOTE — ED PROVIDER NOTE - SKIN NEGATIVE STATEMENT, MLM
Spontaneous, unlabored and symmetrical no abrasions, no jaundice, no lesions, no pruritis, and no rashes.

## 2019-04-10 ENCOUNTER — APPOINTMENT (OUTPATIENT)
Dept: NEUROLOGY | Facility: CLINIC | Age: 59
End: 2019-04-10

## 2020-06-30 ENCOUNTER — EMERGENCY (EMERGENCY)
Facility: HOSPITAL | Age: 60
LOS: 1 days | Discharge: HOME | End: 2020-06-30
Attending: EMERGENCY MEDICINE | Admitting: EMERGENCY MEDICINE
Payer: MEDICAID

## 2020-06-30 VITALS
DIASTOLIC BLOOD PRESSURE: 84 MMHG | RESPIRATION RATE: 18 BRPM | SYSTOLIC BLOOD PRESSURE: 144 MMHG | HEART RATE: 90 BPM | TEMPERATURE: 98 F | OXYGEN SATURATION: 97 %

## 2020-06-30 PROCEDURE — 99283 EMERGENCY DEPT VISIT LOW MDM: CPT | Mod: 25

## 2020-06-30 PROCEDURE — 12001 RPR S/N/AX/GEN/TRNK 2.5CM/<: CPT

## 2020-06-30 RX ORDER — TETANUS TOXOID, REDUCED DIPHTHERIA TOXOID AND ACELLULAR PERTUSSIS VACCINE, ADSORBED 5; 2.5; 8; 8; 2.5 [IU]/.5ML; [IU]/.5ML; UG/.5ML; UG/.5ML; UG/.5ML
0.5 SUSPENSION INTRAMUSCULAR ONCE
Refills: 0 | Status: COMPLETED | OUTPATIENT
Start: 2020-06-30 | End: 2020-06-30

## 2020-06-30 RX ORDER — ACETAMINOPHEN 500 MG
975 TABLET ORAL ONCE
Refills: 0 | Status: COMPLETED | OUTPATIENT
Start: 2020-06-30 | End: 2020-06-30

## 2020-06-30 RX ORDER — OXYCODONE HYDROCHLORIDE 5 MG/1
5 TABLET ORAL ONCE
Refills: 0 | Status: DISCONTINUED | OUTPATIENT
Start: 2020-06-30 | End: 2020-06-30

## 2020-06-30 RX ADMIN — TETANUS TOXOID, REDUCED DIPHTHERIA TOXOID AND ACELLULAR PERTUSSIS VACCINE, ADSORBED 0.5 MILLILITER(S): 5; 2.5; 8; 8; 2.5 SUSPENSION INTRAMUSCULAR at 02:23

## 2020-06-30 RX ADMIN — Medication 975 MILLIGRAM(S): at 02:23

## 2020-06-30 RX ADMIN — OXYCODONE HYDROCHLORIDE 5 MILLIGRAM(S): 5 TABLET ORAL at 02:23

## 2020-06-30 NOTE — ED PROVIDER NOTE - OBJECTIVE STATEMENT
Pertinent PMH/PSH/FHx/SHx and Review of Systems contained within:  59yo F w/ pmh of hyperthyroidism (on methimazole), ?Parkinson's dx (states 2 internal medicine doctors and her PMD all told her she does not have it, but saw a neurologist once who diagnosed her with this and started her on carbidopa/levodopa); PPH of anxiety/depression, p/w left head trauma. Pt states she had just returned home from dinner and was racing  to get inside house, tripped on front porch steps, fell onto left side striking left scalp against first step, no loc, not on blood thinners, no blurred/double vision, no focal neuro symptoms. Pt was able to get up on her own and is walking normally. Reporting mild to moderate pain around site of head lac. Denies etoh/illicit drug use. Last tetanus over 20yrs ago.  No fever/chills, No photophobia/eye pain/changes in vision, No ear pain/sore throat/dysphagia, No chest pain/palpitations, no SOB/cough/wheeze/stridor, No abdominal pain, No N/V/D, no dysuria/frequency/discharge, No neck/back pain, no rash, no new focal neuro symptoms.

## 2020-06-30 NOTE — ED PROVIDER NOTE - PATIENT PORTAL LINK FT
You can access the FollowMyHealth Patient Portal offered by [facility] by registering at the following website: [email or web address]. By joining [facility]’s FollowMyHealth portal, you will also be able to view your health information using other applications (apps) compatible with our system.

## 2020-06-30 NOTE — ED ADULT TRIAGE NOTE - CHIEF COMPLAINT QUOTE
s/p slip and fall tonight. Pt says she slipped and fell in front of her house and hit her head. Has a an abrasion to the left side of her head. No active bleeding. Denies LOC or dizziness. Not on any blood thinners. Pt has PMH of Parkinson's disease, Anxiety, depression.

## 2020-06-30 NOTE — ED PROVIDER NOTE - NSFOLLOWUPINSTRUCTIONS_ED_ALL_ED_FT
Please take ibuprofen (600mg) or tylenol(650mg) every 8 hours as needed for pain.     As discussed, follow up with either your primary doctor, at an Urgent Care Center, or at the emergency department to have your staple removed in 1 week.     Return to the emergency department sooner if you develop any symptoms that are concerning to you.    Call the number for the neurology clinic to schedule an appointment for further workup of your working diagnosis of Parkinson's disease.

## 2020-06-30 NOTE — ED PROVIDER NOTE - PHYSICAL EXAMINATION
Gen: Alert, NAD  Head: 0.5 inch lac over L parietal scalp, mild oozing of blood w/ surrounding soft tissue swelling/superficial hematoma, +ttp;  EOMI, normal lids/conjunctiva  ENT:  normal hearing, patent oropharynx without erythema/exudate  Neck: +supple, no tenderness/meningismus/JVD, +Trachea midline  Chest: no chest wall tenderness, equal chest rise  Pulm: Bilateral BS, normal resp effort, no wheeze/stridor/retractions  CV: RRR, no M/R/G, +dist pulses  Abd: +BS, soft, NT/ND  Rectal: deferred  Mskel: no edema/erythema/cyanosis  Skin: no rash  Neuro: AAOx3, no sensory/motor deficits, CN 2-12 intact, normal finger to nose, negative rhomberg, normal gait; no hand tremors, no masked facies, no cogwheel rigidity

## 2020-06-30 NOTE — ED PROVIDER NOTE - CLINICAL SUMMARY MEDICAL DECISION MAKING FREE TEXT BOX
59yo F w/ pmh/PPH as above, here for head lac after mechanical fall. No indication for imaging at this time. Meds, clean wound, staple lac, dc. Will also give pt neuro outpt f/u for evaluation/2nd opinion about ?parkinson's dx. Pt's daughters outside and will take pt home.

## 2020-06-30 NOTE — ED ADULT NURSE NOTE - NSIMPLEMENTINTERV_GEN_ALL_ED
Implemented All Fall Risk Interventions:  Longs to call system. Call bell, personal items and telephone within reach. Instruct patient to call for assistance. Room bathroom lighting operational. Non-slip footwear when patient is off stretcher. Physically safe environment: no spills, clutter or unnecessary equipment. Stretcher in lowest position, wheels locked, appropriate side rails in place. Provide visual cue, wrist band, yellow gown, etc. Monitor gait and stability. Monitor for mental status changes and reorient to person, place, and time. Review medications for side effects contributing to fall risk. Reinforce activity limits and safety measures with patient and family.

## 2020-06-30 NOTE — ED ADULT NURSE NOTE - OBJECTIVE STATEMENT
Pt is a 60yr old female, A&OX4 and ambulatory, complaining of a mechanical fall on the stairs with 1/2in laceration (no active bleeding at this time) to the left temporal region on the head. Pt reports slight headache, denies any changes in her vision or dizziness. Pt medicated as per order. Breathing even and unlabored. NAD. Denies chest pain, SOB, dizziness, abd. pain, N/V, fever/chills, cough, and dysuria at this time. VS as noted. MD Chávez at bedside. Will continue to monitor.

## 2020-06-30 NOTE — ED PROVIDER NOTE - NSFOLLOWUPCLINICS_GEN_ALL_ED_FT
[facility]  Ne[facility]rology Ep[facility]psy  [address] [place] [address]  Phone: [phone]  Fax: [phone]  Follow Up Time: 7-10 Days

## 2020-08-04 ENCOUNTER — APPOINTMENT (OUTPATIENT)
Dept: NEUROLOGY | Facility: CLINIC | Age: 60
End: 2020-08-04
Payer: MEDICAID

## 2020-08-04 VITALS — TEMPERATURE: 97.5 F

## 2020-08-04 VITALS
HEIGHT: 63 IN | BODY MASS INDEX: 25.69 KG/M2 | DIASTOLIC BLOOD PRESSURE: 87 MMHG | SYSTOLIC BLOOD PRESSURE: 143 MMHG | HEART RATE: 88 BPM | WEIGHT: 145 LBS

## 2020-08-04 DIAGNOSIS — G20 PARKINSON'S DISEASE: ICD-10-CM

## 2020-08-04 PROCEDURE — 99215 OFFICE O/P EST HI 40 MIN: CPT

## 2020-08-04 RX ORDER — ESCITALOPRAM OXALATE 10 MG/1
10 TABLET ORAL
Qty: 30 | Refills: 0 | Status: DISCONTINUED | COMMUNITY
Start: 2018-02-09 | End: 2020-08-04

## 2020-08-04 RX ORDER — QUETIAPINE FUMARATE 50 MG/1
50 TABLET ORAL
Qty: 30 | Refills: 0 | Status: DISCONTINUED | COMMUNITY
Start: 2018-04-30 | End: 2020-08-04

## 2020-08-04 RX ORDER — ROPINIROLE 2 MG/1
2 TABLET, FILM COATED ORAL 3 TIMES DAILY
Qty: 90 | Refills: 2 | Status: DISCONTINUED | COMMUNITY
Start: 2018-12-19 | End: 2020-08-04

## 2020-08-04 RX ORDER — ROPINIROLE 2 MG/1
2 TABLET, FILM COATED ORAL
Qty: 30 | Refills: 0 | Status: DISCONTINUED | COMMUNITY
Start: 2018-01-16 | End: 2020-08-04

## 2020-08-04 NOTE — DISCUSSION/SUMMARY
[FreeTextEntry1] : Patient with marked bradykinesia, rigidity, and gait disorder reportedly responsive to levodopa, which suggests idiopathic parkinson's disease. Her disease duration is also indicative of this diagnosis. In addiition, her neurobehavioral dysregulation is probably linked to ropinerole with underlying DDS\par \par Patient was counseled on the following recommendations:\par \par - discontinue ropinerole\par - start comtan with 1.5 tabs of c/l  q3hras (6-7 doses)\par \par f/u 2-3 weeks for re-evaluation

## 2020-08-04 NOTE — PHYSICAL EXAM
[FreeTextEntry1] : There is masked facies. EOMI. Speech is soft. There is mod-severe rigidity in her limbs, worse in the lower extremities. There is mod. UE bradykinesia R>L and 4+ LE movements. Unable to stand due to her current state.

## 2020-08-04 NOTE — HISTORY OF PRESENT ILLNESS
[FreeTextEntry1] : Patient comes with a diagnosis of parkinson's, which she has had for 10 years. Her initial symptoms were decline in gait and slowing of her movements. She denies ever having tremors. She has had good response to levodopa, which she has been on since her diagnosis along with requip. However, she has developed wearing offs and compulsive tendencies to take more pills than prescribed. As a result, she has recently developed visual hallucinations and treated in the past with seroquel. She gets about 2hrs of ON time--during which, she is able to ambulate normally and do everything independently. She ran out of levodopa and requip 1 week ago and has been virtually immobile and needing assistance with most ADLs. Her VH have since resolved. Denies any hx of LID \par \par nonmotor\par +RBD treated with klonopin\par no constipation\par No OH\par + ICDs\par + depression\par \par Meds:\par c/l 25/100 1.5 tabs 5 times per day (q2hrs)\par ropinerole 2mg TID\par sertaline 50mg\par klonopin 0.5mg qhs\par \par [prior meds\par quetiapine\par \par

## 2020-08-18 ENCOUNTER — APPOINTMENT (OUTPATIENT)
Dept: NEUROLOGY | Facility: CLINIC | Age: 60
End: 2020-08-18
Payer: MEDICAID

## 2020-08-18 VITALS
HEART RATE: 94 BPM | SYSTOLIC BLOOD PRESSURE: 97 MMHG | DIASTOLIC BLOOD PRESSURE: 67 MMHG | BODY MASS INDEX: 25.69 KG/M2 | HEIGHT: 63 IN | WEIGHT: 145 LBS

## 2020-08-18 VITALS — TEMPERATURE: 95.2 F

## 2020-08-18 DIAGNOSIS — F07.0 PERSONALITY CHANGE DUE TO KNOWN PHYSIOLOGICAL CONDITION: ICD-10-CM

## 2020-08-18 PROCEDURE — 99214 OFFICE O/P EST MOD 30 MIN: CPT

## 2020-08-19 PROBLEM — F07.0 NEUROBEHAVIORAL DISORDER: Status: ACTIVE | Noted: 2020-08-19

## 2020-08-19 NOTE — DISCUSSION/SUMMARY
[FreeTextEntry1] : Severe fluctuator with LID and neurobehavioral dysregulation accompanied by hallucinations likely related to levodopa. \par Diarrhea - ?entacapone related\par \par Patient was counseled on the following recommendations:\par \par Discussed DBS with patient and she is interested in possibly having it implanted.  However, her hallucinations and paranoia are keyes and need to be brought under control. We discussed restarting seroquel 12.5mg qAM and leaving the levodopa regimen unchanged. Will consider stopping comtan after she see her PCP for diarrhea w/u. \par \par I will contact her psychiatrist to discuss her psychiatric regimen and refer her for neuropsych evaluation in anticipation of DBS\par \par f/u 1month

## 2020-08-19 NOTE — PHYSICAL EXAM
[FreeTextEntry1] : There is mild masking. Mild-mod. LID. Candido 1-2+ Walks with mild sway but no FOG. Tone is normal

## 2020-08-19 NOTE — HISTORY OF PRESENT ILLNESS
[FreeTextEntry1] : Since her last visit, her ON time has improved. She is getting 2-2.5 hrs of ON periods, but OFF are precipitous. Her dtr reports that hallucinations are occurring throughout the day. patient sees people in the house and has paranoid thoughts directed towards her . These psychotic episodes appear to occur in the ON phase. She see her psychiatrist, Elinor Donnelly, monthly and in the past required seroquel. \par Of note, she had had diarrheal illness for the past several days of unknown etiology\par \par nonmotor\par +RBD treated with klonopin\par no constipation\par No OH\par + ICDs\par + depression\par \par Meds:\par c/l 25/100 1.5 tabs 5 times per day (q2hrs)\par comtan 1 tab 5 times per day\par sinemet CR 50/200 1 tab qhs\par sertaline 50mg\par klonopin 0.5mg qhs\par \par [prior meds\par quetiapine\par requip \par \par Srinath donnelly [phone]\par \par

## 2020-08-19 NOTE — HISTORY OF PRESENT ILLNESS
[FreeTextEntry1] : Since her last visit, her ON time has improved. She is getting 2-2.5 hrs of ON periods, but OFF are precipitous. Her dtr reports that hallucinations are occurring throughout the day. patient sees people in the house and has paranoid thoughts directed towards her . These psychotic episodes appear to occur in the ON phase. She see her psychiatrist, Elinor Donnelly, monthly and in the past required seroquel. \par Of note, she had had diarrheal illness for the past several days of unknown etiology\par \par nonmotor\par +RBD treated with klonopin\par no constipation\par No OH\par + ICDs\par + depression\par \par Meds:\par c/l 25/100 1.5 tabs 5 times per day (q2hrs)\par comtan 1 tab 5 times per day\par sinemet CR 50/200 1 tab qhs\par sertaline 50mg\par klonopin 0.5mg qhs\par \par [prior meds\par quetiapine\par requip \par \par Srinath donnelly [phone]\par \par

## 2020-08-21 ENCOUNTER — APPOINTMENT (OUTPATIENT)
Dept: NEUROLOGY | Facility: CLINIC | Age: 60
End: 2020-08-21

## 2020-09-18 ENCOUNTER — APPOINTMENT (OUTPATIENT)
Dept: NEUROLOGY | Facility: CLINIC | Age: 60
End: 2020-09-18
Payer: MEDICAID

## 2020-09-18 VITALS
WEIGHT: 138 LBS | HEART RATE: 93 BPM | BODY MASS INDEX: 24.45 KG/M2 | SYSTOLIC BLOOD PRESSURE: 123 MMHG | HEIGHT: 63 IN | DIASTOLIC BLOOD PRESSURE: 73 MMHG

## 2020-09-18 PROCEDURE — 99214 OFFICE O/P EST MOD 30 MIN: CPT

## 2020-09-22 NOTE — DISCUSSION/SUMMARY
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 Discussed possibility of DBS and patyient and dtr would like to try med options first. Advise she change seroquel to 25mg BID. May need to reduce several sinemet doses to 1.5 tabs. f/u psych. f/u 1month

## 2020-09-22 NOTE — PHYSICAL EXAM
[FreeTextEntry1] : There is mild masking. Speech is 2+. There is mild-mod dyskinesia. Candido 1+. Walks normally

## 2020-10-16 ENCOUNTER — APPOINTMENT (OUTPATIENT)
Dept: NEUROLOGY | Facility: CLINIC | Age: 60
End: 2020-10-16
Payer: MEDICAID

## 2020-10-16 VITALS — TEMPERATURE: 97.3 F

## 2020-10-16 VITALS
SYSTOLIC BLOOD PRESSURE: 135 MMHG | BODY MASS INDEX: 25.69 KG/M2 | HEART RATE: 83 BPM | HEIGHT: 63 IN | WEIGHT: 145 LBS | DIASTOLIC BLOOD PRESSURE: 80 MMHG

## 2020-10-16 PROCEDURE — 99214 OFFICE O/P EST MOD 30 MIN: CPT

## 2020-10-17 NOTE — HISTORY OF PRESENT ILLNESS
[FreeTextEntry1] : Patient doing well motorically on current regimen. Has minimal wearing offs and nonbothersome dyskinesia. However, her psychosis persists despite being on seroquel 200mg qd. She continues to endorse delusional thinking about her  and has visual hallucinations throughout the day. She is refusing an impatient psych admission recommended by her therapist\par \par Recent neuropsych reports demonstrates mild frontal subcortical dysfunction with marked psychosis\par \par nonmotor\par +RBD treated with klonopin\par no constipation\par No OH\par \par + depression\par \par Meds:\par c/l 25/100 2 tabs 5 times per day (q3hrs)\par comtan 1 tab 5 times per day\par sinemet CR 50/200 1 tab qhs\par seroquel 200mg qhs\par sertaline 100mg\par klonopin 0.5mg qhs\par \par [prior meds\par requip \par \par Srinath nelson [phone]\par \par

## 2020-10-17 NOTE — DISCUSSION/SUMMARY
[FreeTextEntry1] : PD with motor fluctuations with L-dopa induced psychosis. Recommended reducing all doses of sinemet to 1.5 tabs at this time. will consider nuplazid and supported the idea of an inpatient psychiatric admission to manage her psychosis. She should continue to f/u with psych closely. F/U 2 months

## 2020-10-17 NOTE — PHYSICAL EXAM
[FreeTextEntry1] : There is mild masking. Speech is 2+. Tone is normal. There is mild-mod dyskinesia. Candido 1+. Walks normally.

## 2020-10-26 ENCOUNTER — NON-APPOINTMENT (OUTPATIENT)
Age: 60
End: 2020-10-26

## 2020-11-02 DIAGNOSIS — R44.3 HALLUCINATIONS, UNSPECIFIED: ICD-10-CM

## 2020-11-04 ENCOUNTER — NON-APPOINTMENT (OUTPATIENT)
Age: 60
End: 2020-11-04

## 2020-11-17 ENCOUNTER — APPOINTMENT (OUTPATIENT)
Dept: NEUROLOGY | Facility: CLINIC | Age: 60
End: 2020-11-17
Payer: MEDICAID

## 2020-11-17 VITALS — TEMPERATURE: 97.2 F

## 2020-11-17 VITALS
SYSTOLIC BLOOD PRESSURE: 108 MMHG | BODY MASS INDEX: 24.98 KG/M2 | HEART RATE: 84 BPM | DIASTOLIC BLOOD PRESSURE: 72 MMHG | WEIGHT: 141 LBS | HEIGHT: 63 IN

## 2020-11-17 PROCEDURE — 99214 OFFICE O/P EST MOD 30 MIN: CPT

## 2020-11-17 NOTE — HISTORY OF PRESENT ILLNESS
[FreeTextEntry1] : Patient is tolerating reduction in sinemet with minimal off periods. \par Paranoia and VH persist but not has severe following increase in seroquel\par She has more moments of the day where she has greater insight into her hallucinations\par she will be starting nuplazid in 2 days\par Gait is stable\par reports sciatic pain in her right leg intermittently. Relates it to when she is fatigued from over exertion. \par \par nonmotor\par +RBD treated with klonopin\par no constipation\par No OH\par \par + depression\par \par Meds:\par c/l 25/100 1.5tabs 5 times per day (q3hrs)\par comtan 1 tab 5 times per day\par sinemet CR 50/200 1 tab qhs\par seroquel 300mg qhs\par sertaline 100mg\par klonopin 0.5mg qhs\par \par [prior meds\par requip \par \par \par Srinath nelson [phone]\par \par

## 2020-11-17 NOTE — PHYSICAL EXAM
[FreeTextEntry1] :  Paranoid ideation is less during today's visit. There is mild masking. Speech is 2+. Tone is normal. There is mild dyskinesia. Candido 1+. Walks normally.

## 2020-11-30 ENCOUNTER — RX RENEWAL (OUTPATIENT)
Age: 60
End: 2020-11-30

## 2020-12-18 ENCOUNTER — RX RENEWAL (OUTPATIENT)
Age: 60
End: 2020-12-18

## 2020-12-30 ENCOUNTER — APPOINTMENT (OUTPATIENT)
Dept: NEUROLOGY | Facility: CLINIC | Age: 60
End: 2020-12-30
Payer: MEDICAID

## 2020-12-30 VITALS
BODY MASS INDEX: 26.4 KG/M2 | SYSTOLIC BLOOD PRESSURE: 163 MMHG | HEIGHT: 63 IN | HEART RATE: 87 BPM | WEIGHT: 149 LBS | DIASTOLIC BLOOD PRESSURE: 93 MMHG

## 2020-12-30 PROCEDURE — 99214 OFFICE O/P EST MOD 30 MIN: CPT

## 2020-12-30 PROCEDURE — 99072 ADDL SUPL MATRL&STAF TM PHE: CPT

## 2020-12-30 NOTE — DISCUSSION/SUMMARY
[FreeTextEntry1] : PD with psychosis whose motor symptoms are optimized when she takes 1.5 tabs of c/l q3hrs. Reduction in levodopa has not improved her psychiatric condition. I advised that she take 1.5 tabs of c/l for all her doses and continue ER qhs. I will contact her psychiatrist, Dr. Donnelly, to discuss her psychiatric management plan as she may need either inpatient admission, and/or switch to clozapine\par \par f/u 2months

## 2020-12-30 NOTE — HISTORY OF PRESENT ILLNESS
[FreeTextEntry1] : Patient continues to have constant paranoid ideation. She believes her  is cheating on her and has women trapped in their basement. She also states that the family has cameras monitoring her and that people follow her in the house. Lowering two of the doses of her sinemet to 1 tab produced shuffling and freezing during the dose period.She has been on nuplazid for 1 month with no clear benefit. \par \par nonmotor\par +RBD treated with klonopin\par no constipation\par No OH\par \par Meds:\par c/l 25/100 1.5tabs 3 times per day (q3hrs) 1 tab twice per day (doses 3 and 5)\par comtan 1 tab 5 times per day\par sinemet CR 50/200 1 tab qhs\par seroquel 200mg qhs\par sertaline 100mg\par klonopin 0.5mg qhs\par nuplazid 34mg qd\par \par [prior meds\par requip \par \par \par Srinath nelson [phone]\par \par

## 2020-12-30 NOTE — HISTORY OF PRESENT ILLNESS
[FreeTextEntry1] : Patient continues to have constant paranoid ideation. She believes her  is cheating on her and has women trapped in their basement. She also states that the family has cameras monitoring her and that people follow her in the house. Lowering two of the doses of her sinemet to 1 tab produced shuffling and freezing during the dose period.She has been on nuplazid for 1 month with no clear benefit. \par \par nonmotor\par +RBD treated with klonopin\par no constipation\par No OH\par \par Meds:\par c/l 25/100 1.5tabs 3 times per day (q3hrs) 1 tab twice per day (doses 3 and 5)\par comtan 1 tab 5 times per day\par sinemet CR 50/200 1 tab qhs\par seroquel 200mg qhs\par sertaline 100mg\par klonopin 0.5mg qhs\par nuplazid 34mg qd\par \par [prior meds\par requip \par \par \par Srinath nelson [phone]\par \par

## 2020-12-30 NOTE — PHYSICAL EXAM
[FreeTextEntry1] : Last dose was 1 tab \par \par mild masking. There is no tremor dyskinesia. 2+ L>R bradykinesia. When standing, she freezes with left leg. Sl are reduced. Posture is good.

## 2021-02-12 ENCOUNTER — APPOINTMENT (OUTPATIENT)
Dept: NEUROLOGY | Facility: CLINIC | Age: 61
End: 2021-02-12
Payer: MEDICAID

## 2021-02-12 VITALS
BODY MASS INDEX: 24.8 KG/M2 | DIASTOLIC BLOOD PRESSURE: 85 MMHG | HEART RATE: 69 BPM | WEIGHT: 140 LBS | HEIGHT: 63 IN | SYSTOLIC BLOOD PRESSURE: 151 MMHG

## 2021-02-12 PROCEDURE — 99214 OFFICE O/P EST MOD 30 MIN: CPT

## 2021-02-12 PROCEDURE — 99072 ADDL SUPL MATRL&STAF TM PHE: CPT

## 2021-02-12 NOTE — PHYSICAL EXAM
[FreeTextEntry1] : EOMI. Speech is normal. minimal masking.  There is no tremor dyskinesia. 1+ L>R bradykinesia. Gait is normal.

## 2021-02-12 NOTE — HISTORY OF PRESENT ILLNESS
[FreeTextEntry1] : Patient remains motorically stable on current sinemet regimen. However, her psychosis persists. She continues to believe her  is with other women and sees people throughout her house. She has no insight into them and often times gets frightened by the hallucinations. She has not acted on these hallucinations but has had urge to call the police. Trial of olanzapine by her psychiatrist reportedly worsened her bradykinesia and was stopped. She is being tapered off zolft at this time. \par \par nonmotor\par +RBD treated with klonopin\par no constipation\par No OH\par \par Meds:\par c/l 25/100 1.5tabs 3 times per day (q3hrs) \par comtan 1 tab 5 times per day\par sinemet CR 50/200 1 tab qhs\par seroquel 200mg qhs\par sertaline 50mg\par klonopin 0.5mg qhs\par nuplazid 34mg qd\par \par [prior meds\par requip \par \par \par Srinath nelson [phone]\par \par

## 2021-02-12 NOTE — DISCUSSION/SUMMARY
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 \par \par Patient was counseled on the following recommendations:\par \par - leave PD regimen unchanged\par - Patient to f/u with psych closely for further management of her VH and psychosis. May need psych admission or trial of clozapine\par - will try exelon 4,6mg qd given some published  data of its efficacy to reduce hallucinations. \par - will also repeat brain MRI since th last one was in 2014 and her psychosis began last year\par \par f/u 6 - 8 weeks.

## 2021-02-20 ENCOUNTER — RX RENEWAL (OUTPATIENT)
Age: 61
End: 2021-02-20

## 2021-02-27 ENCOUNTER — RX RENEWAL (OUTPATIENT)
Age: 61
End: 2021-02-27

## 2021-02-28 ENCOUNTER — APPOINTMENT (OUTPATIENT)
Dept: MRI IMAGING | Facility: CLINIC | Age: 61
End: 2021-02-28
Payer: MEDICAID

## 2021-02-28 ENCOUNTER — RESULT REVIEW (OUTPATIENT)
Age: 61
End: 2021-02-28

## 2021-02-28 ENCOUNTER — OUTPATIENT (OUTPATIENT)
Dept: OUTPATIENT SERVICES | Facility: HOSPITAL | Age: 61
LOS: 1 days | End: 2021-02-28
Payer: COMMERCIAL

## 2021-02-28 DIAGNOSIS — G20 PARKINSON'S DISEASE: ICD-10-CM

## 2021-02-28 DIAGNOSIS — R44.3 HALLUCINATIONS, UNSPECIFIED: ICD-10-CM

## 2021-02-28 PROCEDURE — 70551 MRI BRAIN STEM W/O DYE: CPT | Mod: 26

## 2021-02-28 PROCEDURE — 70551 MRI BRAIN STEM W/O DYE: CPT

## 2021-03-01 ENCOUNTER — NON-APPOINTMENT (OUTPATIENT)
Age: 61
End: 2021-03-01

## 2021-03-16 ENCOUNTER — RX RENEWAL (OUTPATIENT)
Age: 61
End: 2021-03-16

## 2021-03-20 ENCOUNTER — RX RENEWAL (OUTPATIENT)
Age: 61
End: 2021-03-20

## 2021-03-22 ENCOUNTER — RX RENEWAL (OUTPATIENT)
Age: 61
End: 2021-03-22

## 2021-04-01 ENCOUNTER — RX RENEWAL (OUTPATIENT)
Age: 61
End: 2021-04-01

## 2021-04-06 ENCOUNTER — APPOINTMENT (OUTPATIENT)
Dept: NEUROLOGY | Facility: CLINIC | Age: 61
End: 2021-04-06
Payer: MEDICAID

## 2021-04-06 VITALS
DIASTOLIC BLOOD PRESSURE: 81 MMHG | HEIGHT: 63 IN | BODY MASS INDEX: 27.64 KG/M2 | WEIGHT: 156 LBS | SYSTOLIC BLOOD PRESSURE: 152 MMHG | HEART RATE: 70 BPM

## 2021-04-06 VITALS — TEMPERATURE: 97.2 F

## 2021-04-06 PROCEDURE — 99214 OFFICE O/P EST MOD 30 MIN: CPT

## 2021-04-06 PROCEDURE — 99072 ADDL SUPL MATRL&STAF TM PHE: CPT

## 2021-04-06 NOTE — DISCUSSION/SUMMARY
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 Delusions are worsening. Spoke to patient's daughter about seeking another psych consult to explore psych admission and/or new treatment strategy\par \par Patient was counseled on the following recommendations:\par \par - leave PD regimen unchanged\par - Recommended a consult with Dr. Titus. Patient resistant to elective psych admission \par - f/u with psychiatrist\par \par f/u 6 - 8 weeks.

## 2021-04-06 NOTE — PHYSICAL EXAM
[FreeTextEntry1] : Patient constantly is talking about the women in her house that are with her .  EOMI. Speech is normal. minimal masking. There is no tremor dyskinesia. 1+ L>R bradykinesia. Gait is normal.

## 2021-04-06 NOTE — HISTORY OF PRESENT ILLNESS
[FreeTextEntry1] : Patient reports that she has wearing offs in the mornings. Says that she gets 2 hr of ON time during those time periods and has to take her dose earlier. \par Patient continues to have active hallucinations w/o insights with paranoid delusions directed towards her \par Believes that he has  numerous women in the house. She has broken the walls in order to gain access her basement to see this individuals. \par Patient says that her legs are being held down by these women she is seeing throughout the house. \par She did not want to take exelon for an extended period of time and stopped it after 2 days\par \par nonmotor\par +RBD treated with klonopin\par no constipation\par No OH\par \par Meds:\par c/l 25/100 1.5tabs 3 times per day (q3hrs) \par comtan 1 tab 5 times per day\par sinemet CR 50/200 1 tab qhs\par seroquel 100mg qhs\par sertaline 50mg\par klonopin 0.5mg qhs\par nuplazid 34mg qd\par \par [prior meds\par requip \par \par \par Srinath nelson [phone]\par \par

## 2021-04-20 ENCOUNTER — RX RENEWAL (OUTPATIENT)
Age: 61
End: 2021-04-20

## 2021-04-22 ENCOUNTER — EMERGENCY (EMERGENCY)
Facility: HOSPITAL | Age: 61
LOS: 1 days | Discharge: HOME | End: 2021-04-22
Attending: EMERGENCY MEDICINE | Admitting: EMERGENCY MEDICINE
Payer: MEDICAID

## 2021-04-22 VITALS
SYSTOLIC BLOOD PRESSURE: 140 MMHG | TEMPERATURE: 98 F | HEIGHT: 63 IN | OXYGEN SATURATION: 100 % | HEART RATE: 96 BPM | RESPIRATION RATE: 16 BRPM | DIASTOLIC BLOOD PRESSURE: 93 MMHG

## 2021-04-22 PROCEDURE — 99284 EMERGENCY DEPT VISIT MOD MDM: CPT

## 2021-04-22 NOTE — ED ADULT TRIAGE NOTE - CHIEF COMPLAINT QUOTE
Pt c/o agitation at home. Pt ems pt has been getting aggressive and agitated at home with family that gets worse at night. pt has hx of depression, anxiety and parkinsons. family thinks its due to the medication. Pt states her  has a mistress he's hiding and sees them walking around the house. per ems family and  states its not true. No complaints of chest pain, headache, nausea, dizziness, vomiting  SOB, fever, chills verbalized. Pt to be seen in the main as per NP ross

## 2021-04-22 NOTE — ED ADULT NURSE NOTE - OBJECTIVE STATEMENT
Pt received to room 18. Pt A&Ox4, ambulatory. Pt brought in by EMS, called by her two daughters. Pt states that "I have been  for 40 years and my  has a mistress who lives in the basement." Pt without other complaints at this time. As per pt's family, pt has been taking Parkinson medication and has been having hallucinations for 1 year. No other PMH. Respirations equal and unlabored, no acute distress noted. Abdomen soft, nondistended, nontender. Denies chest pain, palpitations, SOB, fever, cough, chills, N/V/D, headaches, dizziness, recent sick contacts. Pt denies SI/HI, hallucinations at this time. Vital signs as noted, comfort measures provided, call bell within reach. MD at bedside for evaluation, assessment ongoing.

## 2021-04-23 VITALS
OXYGEN SATURATION: 100 % | HEART RATE: 77 BPM | DIASTOLIC BLOOD PRESSURE: 88 MMHG | SYSTOLIC BLOOD PRESSURE: 148 MMHG | TEMPERATURE: 98 F | RESPIRATION RATE: 20 BRPM

## 2021-04-23 DIAGNOSIS — F19.20 OTHER PSYCHOACTIVE SUBSTANCE DEPENDENCE, UNCOMPLICATED: ICD-10-CM

## 2021-04-23 DIAGNOSIS — F43.20 ADJUSTMENT DISORDER, UNSPECIFIED: ICD-10-CM

## 2021-04-23 LAB
ALBUMIN SERPL ELPH-MCNC: 4.2 G/DL — SIGNIFICANT CHANGE UP (ref 3.3–5)
ALP SERPL-CCNC: 99 U/L — SIGNIFICANT CHANGE UP (ref 40–120)
ALT FLD-CCNC: <5 U/L — LOW (ref 4–33)
ANION GAP SERPL CALC-SCNC: 9 MMOL/L — SIGNIFICANT CHANGE UP (ref 7–14)
APPEARANCE UR: CLEAR — SIGNIFICANT CHANGE UP
AST SERPL-CCNC: 19 U/L — SIGNIFICANT CHANGE UP (ref 4–32)
BACTERIA # UR AUTO: ABNORMAL
BASOPHILS # BLD AUTO: 0.02 K/UL — SIGNIFICANT CHANGE UP (ref 0–0.2)
BASOPHILS NFR BLD AUTO: 0.3 % — SIGNIFICANT CHANGE UP (ref 0–2)
BILIRUB SERPL-MCNC: 0.3 MG/DL — SIGNIFICANT CHANGE UP (ref 0.2–1.2)
BILIRUB UR-MCNC: NEGATIVE — SIGNIFICANT CHANGE UP
BUN SERPL-MCNC: 18 MG/DL — SIGNIFICANT CHANGE UP (ref 7–23)
CALCIUM SERPL-MCNC: 9.2 MG/DL — SIGNIFICANT CHANGE UP (ref 8.4–10.5)
CHLORIDE SERPL-SCNC: 104 MMOL/L — SIGNIFICANT CHANGE UP (ref 98–107)
CO2 SERPL-SCNC: 27 MMOL/L — SIGNIFICANT CHANGE UP (ref 22–31)
COLOR SPEC: ABNORMAL
CREAT SERPL-MCNC: 0.43 MG/DL — LOW (ref 0.5–1.3)
DIFF PNL FLD: ABNORMAL
EOSINOPHIL # BLD AUTO: 0.05 K/UL — SIGNIFICANT CHANGE UP (ref 0–0.5)
EOSINOPHIL NFR BLD AUTO: 0.7 % — SIGNIFICANT CHANGE UP (ref 0–6)
EPI CELLS # UR: 2 /HPF — SIGNIFICANT CHANGE UP (ref 0–5)
ETHANOL SERPL-MCNC: <10 MG/DL — SIGNIFICANT CHANGE UP
GLUCOSE SERPL-MCNC: 98 MG/DL — SIGNIFICANT CHANGE UP (ref 70–99)
GLUCOSE UR QL: NEGATIVE — SIGNIFICANT CHANGE UP
HCT VFR BLD CALC: 38.7 % — SIGNIFICANT CHANGE UP (ref 34.5–45)
HGB BLD-MCNC: 12.6 G/DL — SIGNIFICANT CHANGE UP (ref 11.5–15.5)
IANC: 4.23 K/UL — SIGNIFICANT CHANGE UP (ref 1.5–8.5)
IMM GRANULOCYTES NFR BLD AUTO: 0.3 % — SIGNIFICANT CHANGE UP (ref 0–1.5)
KETONES UR-MCNC: ABNORMAL
LEUKOCYTE ESTERASE UR-ACNC: NEGATIVE — SIGNIFICANT CHANGE UP
LYMPHOCYTES # BLD AUTO: 2.46 K/UL — SIGNIFICANT CHANGE UP (ref 1–3.3)
LYMPHOCYTES # BLD AUTO: 34 % — SIGNIFICANT CHANGE UP (ref 13–44)
MCHC RBC-ENTMCNC: 30 PG — SIGNIFICANT CHANGE UP (ref 27–34)
MCHC RBC-ENTMCNC: 32.6 GM/DL — SIGNIFICANT CHANGE UP (ref 32–36)
MCV RBC AUTO: 92.1 FL — SIGNIFICANT CHANGE UP (ref 80–100)
MONOCYTES # BLD AUTO: 0.45 K/UL — SIGNIFICANT CHANGE UP (ref 0–0.9)
MONOCYTES NFR BLD AUTO: 6.2 % — SIGNIFICANT CHANGE UP (ref 2–14)
NEUTROPHILS # BLD AUTO: 4.23 K/UL — SIGNIFICANT CHANGE UP (ref 1.8–7.4)
NEUTROPHILS NFR BLD AUTO: 58.5 % — SIGNIFICANT CHANGE UP (ref 43–77)
NITRITE UR-MCNC: NEGATIVE — SIGNIFICANT CHANGE UP
NRBC # BLD: 0 /100 WBCS — SIGNIFICANT CHANGE UP
NRBC # FLD: 0 K/UL — SIGNIFICANT CHANGE UP
PCP SPEC-MCNC: SIGNIFICANT CHANGE UP
PH UR: 6.5 — SIGNIFICANT CHANGE UP (ref 5–8)
PLATELET # BLD AUTO: 301 K/UL — SIGNIFICANT CHANGE UP (ref 150–400)
POTASSIUM SERPL-MCNC: 3.9 MMOL/L — SIGNIFICANT CHANGE UP (ref 3.5–5.3)
POTASSIUM SERPL-SCNC: 3.9 MMOL/L — SIGNIFICANT CHANGE UP (ref 3.5–5.3)
PROT SERPL-MCNC: 7.4 G/DL — SIGNIFICANT CHANGE UP (ref 6–8.3)
PROT UR-MCNC: NEGATIVE — SIGNIFICANT CHANGE UP
RBC # BLD: 4.2 M/UL — SIGNIFICANT CHANGE UP (ref 3.8–5.2)
RBC # FLD: 12.3 % — SIGNIFICANT CHANGE UP (ref 10.3–14.5)
RBC CASTS # UR COMP ASSIST: 1 /HPF — SIGNIFICANT CHANGE UP (ref 0–4)
SODIUM SERPL-SCNC: 140 MMOL/L — SIGNIFICANT CHANGE UP (ref 135–145)
SP GR SPEC: 1.02 — SIGNIFICANT CHANGE UP (ref 1.01–1.02)
TOXICOLOGY SCREEN, DRUGS OF ABUSE, SERUM RESULT: SIGNIFICANT CHANGE UP
TSH SERPL-MCNC: 0.49 UIU/ML — SIGNIFICANT CHANGE UP (ref 0.27–4.2)
UROBILINOGEN FLD QL: SIGNIFICANT CHANGE UP
WBC # BLD: 7.23 K/UL — SIGNIFICANT CHANGE UP (ref 3.8–10.5)
WBC # FLD AUTO: 7.23 K/UL — SIGNIFICANT CHANGE UP (ref 3.8–10.5)
WBC UR QL: 5 /HPF — SIGNIFICANT CHANGE UP (ref 0–5)

## 2021-04-23 PROCEDURE — 90792 PSYCH DIAG EVAL W/MED SRVCS: CPT

## 2021-04-23 NOTE — ED PROVIDER NOTE - ATTENDING CONTRIBUTION TO CARE
I have personally performed a face to face bedside history and physical examination of this patient. I have discussed the history, examination, review of systems, assessment and plan of management with the resident. I have reviewed the electronic medical record and amended it to reflect my history, review of systems, physical exam, assessment and plan.    Brief HPI:  59 yo F w/ PMHx of HTN, parkinson's, MRI consistent with lewy body dementia p/w hallucinations.  On arrival, patient states he family wanted her to come to ED but disagrees with their concern.  Currently without complaints.      Vitals:   Reviewed    Exam:    GEN:  Non-toxic appearing, non-distressed, speaking full sentences, non-diaphoretic, AAOx3  HEENT:  NCAT, neck supple, EOMI, PERRLA, sclera anicteric, no conjunctival pallor or injection, no stridor, normal voice, no tonsillar exudate, uvula midline, tympanic membranes and external auditory canals normal appearing bilaterally   CV:  regular rhythm and rate, s1/s2 audible, no murmurs, rubs or gallops, peripheral pulses 2+ and symmetric  PULM:  non-labored respirations, lungs clear to auscultation bilaterally, no wheezes, crackles or rales  ABD:  non distended, non-tender, no rebound, no guarding, negative Uribe's sign, bowel sounds normal, no cvat  MSK:  no gross deformity, non-tender extremities and joints, range of motion grossly normal appearing, no extremity edema, extremities warm and well perfused   NEURO:  AAOx3, CN II-XII intact, motor 5/5 in upper and lower extremities bilaterally, sensation grossly intact in extremities and trunk, finger to nose testing wnl, no nystagmus, negative Romberg, no pronator drift, no gait deficit  SKIN:  warm, dry, no rash or vesicles     A/P:  59 yo F w/ PMHx of HTN, parkinson's, MRI consistent with lewy body dementia p/w reported hallucinations.  VSS AF.  Exam non-toxic appearing, aaox3, no focal neuro deficit.  Will attempt to obtain collateral from family.  If reported hallucinations at home, will obtain labs,  consult.  Lewy body dementia considered as etiology given mri findings.  Dispo pending.

## 2021-04-23 NOTE — ED BEHAVIORAL HEALTH NOTE - BEHAVIORAL HEALTH NOTE
Pt lives with her  and daughter (25 yo). MALACHI spoke to older daughter, Della ([phone]) for collateral. Pt is dx with Parkinson's and is prescribed Entacapone and Carbidopa, Della reports these medications have the pt hallucinating and a known side effect is hallucinations. These hallucinations are not commands and are visual where she will see people in the house who she believes is sleeping with her . Della reports the pt does not become violent and never displays wanting to hurt her . Pt has a psychiatrist from [facility] Dr Donnelly (), daughter says this Dr does not take the hallucinations seriously. Pt is also prescribed: Nuplazid, Clonazepam, and Quetiapine. Pt has a hx of anxiety and depression and no past hospitalizations. No SI, HI SIB. Daughter reported about a year ago pt was abusing her medications and her medications were stopped and restarted. Pt currently does not consume drugs or alcohol. Daughter said she would like the medications to be adjusted MALACHI explained that is an outpatient treatment. Daughter can  pt if d/c'ed.

## 2021-04-23 NOTE — ED BEHAVIORAL HEALTH ASSESSMENT NOTE - RISK ASSESSMENT
Risk factors include chronic medical conditions, ongoing visual hallucinations, active Sinemet dependence and hx of Rx medication misuse, and impaired insight into symptoms. Protective factors include denial of current SI/HI, lack of SA/SIB hx, lack of violence/aggression, engagement with treatment, supportive family, no prior psychiatric hospitalizations, and current connection to outpatient care. At this time, patient is not at acutely elevated risk of harm to self or others and does not require psychiatric hospitalization. Low Acute Suicide Risk

## 2021-04-23 NOTE — ED BEHAVIORAL HEALTH ASSESSMENT NOTE - SUMMARY
61 yo F, domiciled with , has 2 adult daughters, with questionable history of Parkinsons Disease (per chart review, multiple neurologists have told her she does not have PD and neuro exams on prior admissions not c/w diagnosis), on Sinemet x9 years w/Sinemet dependence, previously seen at [facility] by Dr. Donis for Sinemet dependence, currently sees psychiatrist Dr Donnelly () at [facility], ?PPHx of depression/anxiety, no prior psychiatric hospitalizations, no hx SA/SIB, PMHx of thyroid problem (on synthroid and methimazole), BIB daughter for worsening visual hallucinations of a woman in her house whom she believes her  is cheating with.     On interview, patient is anxious and perseverative about her belief about her 's infidelity and has poor insight into the etiology of her symptoms. However, she denies depressed mood, hopelessness, anhedonia, suicidality, homicidality, AH, paranoia, and referential ideas, and she demonstrates ability to care for self with good sleep and appetite. Patient's daughter has no acute safety concerns. Suspect that patient is experiencing visual hallucinations due to chronic Sinemet use. At this time, patient does not require psychiatric hospitalization and is appropriate for outpatient follow up with neurologist and psychiatrist to address medication issues outpatient. Recommend patient discuss decrease in Sinemet dose with neurologist and possible further evaluation to r/o Lewy Body dementia.

## 2021-04-23 NOTE — ED PROVIDER NOTE - PROGRESS NOTE DETAILS
Ptear PGY2 - Cleared by psych to f/u w/ outpt. neuro and psych.  Psych spoke w/ daughter about the plan as well.  Will janet.

## 2021-04-23 NOTE — ED BEHAVIORAL HEALTH ASSESSMENT NOTE - OTHER PAST PSYCHIATRIC HISTORY (INCLUDE DETAILS REGARDING ONSET, COURSE OF ILLNESS, INPATIENT/OUTPATIENT TREATMENT)
No formal psych diagnoses. No prior admissions. Currently sees Dr. Donnelly at [facility]. No hx of SA/SIB. No hx of violence/aggression (though has thrown objects when family withheld Sinemet in the past). +Visual hallucinations of woman in her house since at least 2018. Also hx of paranoia since 2011 per chart review.

## 2021-04-23 NOTE — ED PROVIDER NOTE - PATIENT PORTAL LINK FT
You can access the FollowMyHealth Patient Portal offered by [facility] by registering at the following website: [email or web address]. By joining [employer]’s FollowMyHealth portal, you will also be able to view your health information using other applications (apps) compatible with our system.

## 2021-04-23 NOTE — ED BEHAVIORAL HEALTH ASSESSMENT NOTE - DETAILS
deferred patient is able to contract for safety endorses being "frozen" in the morning, also endorses restless legs and inability to sit still spoke with daughter denies suicidal ideation, plan, and intent patient is able to contract for safety, agreed to return to ED or go to [facility] for worsening symptoms

## 2021-04-23 NOTE — ED BEHAVIORAL HEALTH ASSESSMENT NOTE - NSBHSACONSEQUENCE_PSY_A_CORE FT
Previously followed by addiction clinic at [facility]. Pt declined Sinemet detox multiple times in the past.

## 2021-04-23 NOTE — ED BEHAVIORAL HEALTH ASSESSMENT NOTE - DESCRIPTION
Patient was evaluated. VSS. No medical complaints. Per ED assessment, no medical work up required. CBC, CMP, UA unremarkable. Utox negative.    Vital Signs Last 24 Hrs  T(C): 36.7 (22 Apr 2021 23:47), Max: 36.7 (22 Apr 2021 23:47)  T(F): 98 (22 Apr 2021 23:47), Max: 98 (22 Apr 2021 23:47)  HR: 82 (22 Apr 2021 23:47) (82 - 96)  BP: 151/95 (22 Apr 2021 23:47) (140/93 - 151/95)  BP(mean): --  RR: 18 (22 Apr 2021 23:47) (16 - 18)  SpO2: 100% (22 Apr 2021 23:47) (100% - 100%) questionable hx of Parkinson's Disease, per chart review multiple neurologists have refuted this diagnosis in the past but she continues to get Sinemet prescribed; also questionable hx of hypo- vs hyperthyroid (on both synthroid and methimazole simultaneously) Lives with . Has adult daughters Della and Everardo. Homemaker.

## 2021-04-23 NOTE — ED PROVIDER NOTE - OBJECTIVE STATEMENT
60F w/ PMHx of HTN p/w hallucinations.  States her  has a mistress w/ two kids.  On cinamet for Parkinson's.  Spoke w/ daughter 60F w/ PMHx of HTN p/w hallucinations.  States her  has a mistress w/ two kids.  On cinamet for Parkinson's.  Spoke w/ daughter who states pt. has had hallucinations for a year now which has been worsening.  Has seen an outpt psychiatrist who's trialed her on some unknown PO meds which haven't been working.  Denies any SI/HI.

## 2021-04-23 NOTE — ED BEHAVIORAL HEALTH ASSESSMENT NOTE - HPI (INCLUDE ILLNESS QUALITY, SEVERITY, DURATION, TIMING, CONTEXT, MODIFYING FACTORS, ASSOCIATED SIGNS AND SYMPTOMS)
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 Donis for Sinemet dependence, currently sees psychiatrist Dr Donnelly () at [facility], ?PPHx of depression/anxiety, no prior psychiatric hospitalizations, no hx SA/SIB, PMHx of hypo- vs hyperthyroid? (on synthroid and methimazole), BIB daughter for worsening visual hallucinations.     On interview, patient is anxious, perseverative on her belief her  is cheating on her. Patient endorses seeing a woman in her house who she believes her  is cheating with, says she sees the woman in her basement, the woman comes into her room, the woman sleeps between her and her  in bed. Patient is certain that this woman exists despite her  and daughter's insistence that this is a hallucination. Patient reports feeling upset over this belief but denies depressed mood, hopelessness, insomnia, changes in appetite, anhedonia, passive and active suicidality, and homicidality. She denies other psychotic symptoms including auditory hallucinations, referential ideas, and paranoia. She reports that she takes Zoloft daily and seroquel and ativan nightly for sleep. She reports wanting to stay alive for her daughters and grandchildren.     Patient states that she was diagnosed with Parkinson's Disease 9 years ago and has been taking Sinemet since that time. She endorses misuse of her medication in the past but says that she no longer takes more than prescribed since she has been seeing current neurologist Dr. Ann. Patient says she is "frozen" when she doesn't take her meds, but also says her legs are restless and she cannot sit still and must pace around continuously. Per chart review, patient's neuro exam on prior admission was inconsistent with PD and she was advised to see a Movement Disorder specialist but did not follow up. When asked about this history, patient repeatedly insists she has PD and requires Sinemet in order to move. Patient will not entertain writer's suggestion that she may be experiencing visual hallucination of a woman due to her medication.     See  note for collateral from daughter. Daughter has no acute safety concerns for patient.

## 2021-04-23 NOTE — ED PROVIDER NOTE - NSFOLLOWUPINSTRUCTIONS_ED_ALL_ED_FT
You were seen for evaluation.    Your work up in the ED did not show anything acutely concerning.  You were cleared by the psychiatry physicians as well.    Your hallucinations is likely due to the cinamet.  Please follow up with your neurologist and psychiatrist as you may need a dose adjustment.    If you have any concerning symptoms, seek immediate medical attention.

## 2021-04-23 NOTE — ED BEHAVIORAL HEALTH ASSESSMENT NOTE - SAFETY PLAN ADDT'L DETAILS
Education provided regarding environmental safety / lethal means restriction/Provision of National Suicide Prevention Lifeline 6-319-438-TALK (2258)

## 2021-04-23 NOTE — ED BEHAVIORAL HEALTH ASSESSMENT NOTE - CASE SUMMARY
61 yo F, domiciled with , has 2 adult daughters, with questionable history of Parkinsons Disease (per chart review, multiple neurologists have told her she does not have PD and neuro exams on prior admissions not c/w diagnosis), on Sinemet x9 years w/Sinemet dependence, previously seen at [facility] by Dr. Donis for Sinemet dependence, currently sees psychiatrist Dr Donnelly () at [facility], ?PPHx of depression/anxiety, no prior psychiatric hospitalizations, no hx SA/SIB, PMHx of thyroid problem (on synthroid and methimazole), BIB daughter for worsening visual hallucinations of a woman in her house whom she believes her  is cheating with.     On interview, patient is anxious and perseverative about her belief about her 's infidelity and has poor insight into the etiology of her symptoms. However, she denies depressed mood, hopelessness, anhedonia, suicidality, homicidality, AH, paranoia, and referential ideas, and she demonstrates ability to care for self with good sleep and appetite. Patient's daughter has no acute safety concerns. Suspect that patient is experiencing visual hallucinations due to chronic Sinemet use. At this time, patient does not require psychiatric hospitalization and is appropriate for outpatient follow up with neurologist and psychiatrist to address medication issues outpatient. Recommend patient discuss decrease in Sinemet dose with neurologist and possible further evaluation to r/o Lewy Body dementia.

## 2021-04-23 NOTE — ED BEHAVIORAL HEALTH ASSESSMENT NOTE - NSSUICPROTFACT_PSY_ALL_CORE
Responsibility to children, family, or others/Identifies reasons for living/Supportive social network of family or friends/Positive therapeutic relationships/Rastafari beliefs

## 2021-04-23 NOTE — ED PROVIDER NOTE - CLINICAL SUMMARY MEDICAL DECISION MAKING FREE TEXT BOX
60F p/w hallucinations.  No medical complaints at this time, VSS, exam unremarkable.  Will move to  as no medical w/u indicated at this time.  Will reassess.

## 2021-04-23 NOTE — ED BEHAVIORAL HEALTH ASSESSMENT NOTE - MEDICATIONS (PRESCRIPTIONS, DIRECTIONS)
continue current home medications at this time, recommend to discuss decrease in Sinemet dose w/neurologist

## 2021-04-24 LAB
CULTURE RESULTS: SIGNIFICANT CHANGE UP
SPECIMEN SOURCE: SIGNIFICANT CHANGE UP

## 2021-05-17 ENCOUNTER — NON-APPOINTMENT (OUTPATIENT)
Age: 61
End: 2021-05-17

## 2021-05-21 ENCOUNTER — APPOINTMENT (OUTPATIENT)
Dept: NEUROLOGY | Facility: CLINIC | Age: 61
End: 2021-05-21
Payer: MEDICAID

## 2021-05-21 VITALS
HEIGHT: 63 IN | HEART RATE: 71 BPM | WEIGHT: 158 LBS | BODY MASS INDEX: 28 KG/M2 | DIASTOLIC BLOOD PRESSURE: 72 MMHG | SYSTOLIC BLOOD PRESSURE: 128 MMHG

## 2021-05-21 PROCEDURE — 99215 OFFICE O/P EST HI 40 MIN: CPT

## 2021-05-21 PROCEDURE — 99072 ADDL SUPL MATRL&STAF TM PHE: CPT

## 2021-05-21 NOTE — DISCUSSION/SUMMARY
[FreeTextEntry1] : PD with psychosis c/b dopaminergics with nonmotor/motor fluctuations. \par \par Patient was counseled on the following recommendations:\par - add azilect 1mg qd to regimen. Reviewed possible AEs with family\par - cont rest of PD regimen\par - Referred her to Jerry Don, Jaymie Delgado, or Barry Urbina for psychiatric management at this time. Her poorly controlled psychosis is limiting pharmacological options to manage her fluctuations at this time\par - will consider brain PET in the future. \par f/u 6 - 8 weeks.

## 2021-05-21 NOTE — HISTORY OF PRESENT ILLNESS
[FreeTextEntry1] : Patient reports that she is getting 2.5 hr of ON time. Tends to take morning doses q2hrs but family states that patient is not moving slow in the mornings; however, she feels an internal sensation of anxiety and stiffness after 2hrs from her dose. \par \par Her  reports that patient continues to hallucinate and have paranoid ideation towards him regarding infidelity. Patient continues to believe that evil spirits are in her house that incapacitate her. Her psychiatric regimen has not changed in 6 months. She was taken to [facility] ED since her last visit due to severe hallucinations and was discharged. \par \par \par nonmotor\par +RBD treated with klonopin\par no constipation\par No OH\par \par Meds:\par c/l 25/100 1.5tabs 3 times per day (q3hrs) \par comtan 1 tab 5 times per day\par sinemet CR 50/200 1 tab qhs\par seroquel 100mg qhs\par sertaline 50mg\par klonopin 0.5mg qhs\par nuplazid 34mg qd\par \par [prior meds\par requip \par \par \par Srinath nelson [phone]\par \par

## 2021-05-21 NOTE — PHYSICAL EXAM
[FreeTextEntry1] : Patient constantly is talking about the women in her house that are with her . EOMI. Speech is normal. minimal masking. There is no tremor dyskinesia. 1+ L>R bradykinesia. Gait is normal.  \par

## 2021-05-28 RX ORDER — RASAGILINE 1 MG/1
1 TABLET ORAL DAILY
Qty: 30 | Refills: 3 | Status: DISCONTINUED | COMMUNITY
Start: 2021-05-21 | End: 2021-05-28

## 2021-06-01 ENCOUNTER — RX RENEWAL (OUTPATIENT)
Age: 61
End: 2021-06-01

## 2021-06-02 RX ORDER — SERTRALINE HYDROCHLORIDE 50 MG/1
50 TABLET, FILM COATED ORAL
Qty: 30 | Refills: 0 | Status: DISCONTINUED | COMMUNITY
Start: 2018-03-02 | End: 2021-06-02

## 2021-06-08 ENCOUNTER — NON-APPOINTMENT (OUTPATIENT)
Age: 61
End: 2021-06-08

## 2021-06-21 ENCOUNTER — RX RENEWAL (OUTPATIENT)
Age: 61
End: 2021-06-21

## 2021-07-07 ENCOUNTER — APPOINTMENT (OUTPATIENT)
Dept: NEUROLOGY | Facility: CLINIC | Age: 61
End: 2021-07-07
Payer: MEDICAID

## 2021-07-07 VITALS
HEIGHT: 63 IN | WEIGHT: 157 LBS | HEART RATE: 66 BPM | DIASTOLIC BLOOD PRESSURE: 76 MMHG | BODY MASS INDEX: 27.82 KG/M2 | SYSTOLIC BLOOD PRESSURE: 119 MMHG

## 2021-07-07 PROCEDURE — 99214 OFFICE O/P EST MOD 30 MIN: CPT

## 2021-07-07 PROCEDURE — 99072 ADDL SUPL MATRL&STAF TM PHE: CPT

## 2021-07-07 RX ORDER — RIVASTIGMINE 4.6 MG/24H
4.6 PATCH, EXTENDED RELEASE TRANSDERMAL DAILY
Qty: 30 | Refills: 3 | Status: DISCONTINUED | COMMUNITY
Start: 2021-02-12 | End: 2021-07-07

## 2021-07-07 NOTE — HISTORY OF PRESENT ILLNESS
[FreeTextEntry1] : Patient's motor fluctuations are better since starting azilect\par Hallucinations  persist but overall stable per her dtr. Has not seen neuropsychiatrist due to insurance issues\par First LD dose takes 1-1.5 hr to take effect\par LID are also better controlled. \par She remains active--gardening, mowing her lawn, and doing other household chores\par Psychiatrist recently lowered zoloft. \par \par nonmotor\par +RBD treated with klonopin\par Sleeping well\par no constipation\par No OH\par \par Meds:\par azilect 1mg qd\par c/l 25/100 1.5tabs 5 times per day (q3hrs) \par comtan 1 tab 5 times per day\par sinemet CR 50/200 1 tab qhs\par seroquel 200mg qhs\par sertaline 50mg \par klonopin 0.5mg qhs\par nuplazid 34mg qd\par \par [prior meds\par requip \par \par \par Srinath nelson [phone]\par \par

## 2021-07-07 NOTE — PHYSICAL EXAM
[FreeTextEntry1] : Calm.  Speech is normal. Does not express paranoid thoughts.  minimal masking. There is no tremor dyskinesia. 1+ L>R bradykinesia. Gait is normal.

## 2021-07-07 NOTE — DISCUSSION/SUMMARY
[FreeTextEntry1] : PD with psychosis whose psychiatric state has stablized and motor fluctuations are less since starting azilect\par \par Patient was counseled on the following recommendations:\par \par - leave PD regimen unchanged\par - Patient to f/u with psych closely for further management of her VH and psychosis. \par - continue to stay physically active\par \par f/u 6 - 8 weeks.

## 2021-07-14 ENCOUNTER — RX RENEWAL (OUTPATIENT)
Age: 61
End: 2021-07-14

## 2021-07-22 ENCOUNTER — NON-APPOINTMENT (OUTPATIENT)
Age: 61
End: 2021-07-22

## 2021-08-18 ENCOUNTER — RX RENEWAL (OUTPATIENT)
Age: 61
End: 2021-08-18

## 2021-08-20 ENCOUNTER — APPOINTMENT (OUTPATIENT)
Dept: NEUROLOGY | Facility: CLINIC | Age: 61
End: 2021-08-20
Payer: MEDICAID

## 2021-08-20 ENCOUNTER — RX RENEWAL (OUTPATIENT)
Age: 61
End: 2021-08-20

## 2021-08-20 VITALS
HEIGHT: 63 IN | SYSTOLIC BLOOD PRESSURE: 100 MMHG | BODY MASS INDEX: 28.35 KG/M2 | WEIGHT: 160 LBS | HEART RATE: 71 BPM | DIASTOLIC BLOOD PRESSURE: 59 MMHG

## 2021-08-20 VITALS — BODY MASS INDEX: 25.86 KG/M2 | WEIGHT: 146 LBS

## 2021-08-20 PROCEDURE — 99214 OFFICE O/P EST MOD 30 MIN: CPT

## 2021-08-20 PROCEDURE — 99072 ADDL SUPL MATRL&STAF TM PHE: CPT

## 2021-08-20 NOTE — DISCUSSION/SUMMARY
[FreeTextEntry1] : 61years old woman with PD with psychosis whose psychiatric. Also attention deficient hyperactive.  \par Azilect was working for motor fluctuation however discontinued for hallucination, which is an issue now for which she saw psychiatrist who increased Seroquel from 200 mg to 300 mg qhs. Noted levodopa induced Dyskinesia. \par \par Patient was counseled on the following recommendations:\par \par - We will plan to reduce the Levodopa 5th dose (8 pm ) to 1 tab. Continue the same 1.5 mg dose for the rest of the 4 doses and ER levodopa at qhs. Also continue Neuplazid and Entacapone with the same dose. \par - Encouraged  to f/u with psych closely for further management of her VH and psychosis. \par - Will watch on attention deficiency \par - Encouraged to continue to stay physically active\par -- Follow-up in 3 months \par \par Encouraged to increase exercise and physical activity and maintain an active social and intellectual life.More than 50% of the visit were dedicated to review of treatment, therapeutic plan, and prognosis, and patient was counseled on coping and physical and emotional wellbeing.\par

## 2021-08-20 NOTE — HISTORY OF PRESENT ILLNESS
[FreeTextEntry1] : Yvonne Olson is a 61 years old woman with parkinsonism and psychosis. \par Since last visit her motor fluctuations are better since starting Azilect, but increased visual hallucination and discontinued. Hallucination worse, sees people not real , they do bad thing to her: seeing more people now. Was able to see neuropsychiatrist who increased Seroquel from 200 mg to 300 mg and discontinued sertraline. She is now taking Sinemet  mg 1.5 tabs 5 times a day, Entacapone 200 mg 5 times a day, Neuplazid once a day and Sinemet Er at bed time at 1030 pm. Reports some dyskinesia -- variable in time whether after or before the levodopa dose. Denies any nausea. \par Slowness is better. Balance, gait is okay. She remains active--gardening, mowing her lawn, and doing other household chores. \par \par nonmotor\par Sleeping well\par +RBD treated with klonopin\par no constipation, BM everyday. \par + Sometimes dizziness on standing\par Hallucination worse, sees people not real , they do bad thing to her: seeing more people now. No paranoia. \par Attention spell reduced. \par \par Meds:\par Discontinued by choice: azilect 1mg qd\par  Carbidopa- Levodopa: 25/100 1.5 tabs 5 times per day (q3hrs)   { 8-8.30 am , 11:30 am, 2 pm, 5 pm, 8 pm)\par  Entacapon 200 mg : (comtan ) 1 tab 5 times per day, as above time \par  Carbidopa-Levodopa CR 50/200 1 tab qhs 1030 pm \par  nuplazid 34mg qd\par seroquel 300mg qhs (Increased from 200 mg) \par Discontinued: By Psychiatrixt: sertaline 50mg \par klonopin 0.5mg qhs\par \par [prior meds\par requip \par

## 2021-08-23 ENCOUNTER — RX RENEWAL (OUTPATIENT)
Age: 61
End: 2021-08-23

## 2021-09-17 ENCOUNTER — APPOINTMENT (OUTPATIENT)
Dept: NEUROLOGY | Facility: CLINIC | Age: 61
End: 2021-09-17
Payer: MEDICAID

## 2021-09-17 VITALS
WEIGHT: 143 LBS | HEIGHT: 63 IN | HEART RATE: 70 BPM | BODY MASS INDEX: 25.34 KG/M2 | DIASTOLIC BLOOD PRESSURE: 74 MMHG | SYSTOLIC BLOOD PRESSURE: 121 MMHG

## 2021-09-17 PROCEDURE — 99214 OFFICE O/P EST MOD 30 MIN: CPT

## 2021-09-17 PROCEDURE — 99072 ADDL SUPL MATRL&STAF TM PHE: CPT

## 2021-09-17 NOTE — PHYSICAL EXAM
[FreeTextEntry1] : Calm. Speech is normal. minimal masking. There is no tremor dyskinesia. Tone i snormal. 1+ L>R bradykinesia. Gait is normal.  There is mild generalized LID.

## 2021-09-17 NOTE — HISTORY OF PRESENT ILLNESS
[FreeTextEntry1] : Patient's hallucinations have worsened since her last visit. Continues to see people in her house constantly. Dtr says that increase in VH  correlates with the return of patient's  from a trip\par Seroquel 300mg was not well tolerated and was reduced back to 200mg by her psychiatrist. However, her seroquel doses were escalated rapidly, which may have contributed to some of the intolerance she experienced. \par She remains very active\par She says that she gets about 2hrs of ON time, especially in the afternoons\par \par nonmotor\par +RBD treated with klonopin\par Sleeping well\par no constipation\par No OH\par \par Meds:\par azilect 1mg qd\par c/l 25/100 1.5tabs 5 times per day (q3hrs) \par comtan 1 tab 5 times per day\par sinemet CR 50/200 1 tab qhs\par seroquel 200mg qhs\par sertaline 50mg \par klonopin 0.5mg qhs\par nuplazid 34mg qd\par \par [prior meds\par requip \par \par \par Srinath nelson [phone]\par \par

## 2021-09-17 NOTE — DISCUSSION/SUMMARY
[FreeTextEntry1] : PD with psychosis and motor fluctuations. \par \par Patient was counseled on the following recommendations:\par \par - increase doses 3 and 5 of LD to 2 tabs and d/c comtan for those doses as well. \par - leave rest of the PD regimen the same\par - ask that patient suggest to psychiatrist to raise seroquel to 250mg qd\par - cont azilect. may consider switch to xadago in the future. \par - f/u psych\par \par f/u 6 - 8 weeks.

## 2021-10-19 ENCOUNTER — APPOINTMENT (OUTPATIENT)
Dept: NEUROLOGY | Facility: CLINIC | Age: 61
End: 2021-10-19
Payer: MEDICAID

## 2021-10-19 VITALS
HEIGHT: 63 IN | SYSTOLIC BLOOD PRESSURE: 128 MMHG | BODY MASS INDEX: 25.34 KG/M2 | WEIGHT: 143 LBS | HEART RATE: 80 BPM | DIASTOLIC BLOOD PRESSURE: 77 MMHG

## 2021-10-19 PROCEDURE — 99214 OFFICE O/P EST MOD 30 MIN: CPT

## 2021-10-19 PROCEDURE — 99072 ADDL SUPL MATRL&STAF TM PHE: CPT

## 2021-10-26 ENCOUNTER — NON-APPOINTMENT (OUTPATIENT)
Age: 61
End: 2021-10-26

## 2021-10-26 NOTE — DISCUSSION/SUMMARY
[FreeTextEntry1] : PD with psychosis and motor fluctuations that are stable overall. \par neck mass\par \par Patient was counseled on the following recommendations:\par \par - leave PD regimen unchanged\par - f/u psych\par - f/u PCP for neck mass evaluation\par \par \par f/u 3months

## 2021-10-26 NOTE — PHYSICAL EXAM
[FreeTextEntry1] : Speech is normal. minimal masking. There is no tremor dyskinesia. Tone is normal. 1+ L>R bradykinesia. Gait is normal. There is mild generalized LID.  \par \par There is an indurated anterior neck mass near clavicular head

## 2021-11-13 ENCOUNTER — EMERGENCY (EMERGENCY)
Facility: HOSPITAL | Age: 61
LOS: 1 days | Discharge: HOME | End: 2021-11-13
Admitting: EMERGENCY MEDICINE
Payer: MEDICAID

## 2021-11-13 VITALS
HEIGHT: 63 IN | DIASTOLIC BLOOD PRESSURE: 68 MMHG | TEMPERATURE: 98 F | RESPIRATION RATE: 16 BRPM | HEART RATE: 106 BPM | OXYGEN SATURATION: 99 % | SYSTOLIC BLOOD PRESSURE: 142 MMHG

## 2021-11-13 PROCEDURE — 99284 EMERGENCY DEPT VISIT MOD MDM: CPT

## 2021-11-13 NOTE — ED PROVIDER NOTE - PATIENT PORTAL LINK FT
You can access the FollowMyHealth Patient Portal offered by [facility] by registering at the following website: [email or web address]. By joining [employer]’s FollowMyHealth portal, you will also be able to view your health information using other applications (apps) compatible with our system.

## 2021-11-13 NOTE — ED PROVIDER NOTE - NSICDXPASTMEDICALHX_GEN_ALL_CORE_FT
PAST MEDICAL HISTORY:  Anxiety     Depression (emotion)     Diabetes     Hyperthyroidism     Parkinson disease

## 2021-11-13 NOTE — ED PROVIDER NOTE - IV ALTEPLASE INCLUSION HIDDEN
show Patient is a 29 year old single unemployed non-caregiver AAF currently residing in a private residence alone. PPH Bipolar Disorder, Cannabis Dependence. Hx of past inpatient admissions- last at [facility] x 3 weeks and discharged 8/3/20. No history of suicide attempts. Patient denies history of aggression/legal issues. + cannabis dependence, no other history of substance use. No history of withdrawal or seizures. PMH Celiacs disease & migraines. BIBA referred by BF for manic behavior.    Patient presents to the ER in the context of manic behavior. Patient reports having issue getting her medication from pharmacy. Patient is a 29 year old single unemployed non-caregiver AAF currently residing in a private residence alone. PPH Bipolar Disorder, PTSD, Cannabis Dependence. Hx of past inpatient admissions- last at [facility] x 3 weeks and discharged 8/3/20. No history of suicide attempts. Patient endorses history of aggression, she denies legal issues. + cannabis dependence, no other history of substance use. No history of withdrawal or seizures. PMH Celiacs disease & migraines. BIBA referred by BF for manic behavior.    Patient presents to the ER in the context of manic behavior. Patient reports having issue getting her medication from pharmacy resulting in decreased sleep and irritability. Upon arrival patient was irritable and endorsed migraine symptoms and AH of ringing and visual disturbances. After sleeping she woke up and reported improvement in sleeping and denied any visual or auditory disturbances. She was calm and cooperative. Per collateral she has irritability at baseline likely character pathology. Patient did take extra dose of Zyprexa but denies it was suicidal in nature and stated she just wanted to fall asleep and had missed doses. She was understanding and educated on importance of taking medication as prescribed. She is treatment seeking, able to safety plan and future oriented. She did not present acutely psychotic, manic or depressed. Patient denied SI/HI/SIB/intent/plan. She possibly was displaying hypomanic symptoms related to intoxication and insomnia but at this time does not present this way.  She was offered and refused inpatient psychiatric admission.  She does not meet criteria for involuntary inpatient admission. Recommend discharge and patient to Follow up with [facility] 8/10/20. Prescription confirmed for  at [facility] pharmacy.     Extensive safety planning performed with patient and family. In addition to extensive discussion of safe places patient can go to, distraction techniques, coping skills, motivational interviewing and who patient can call in the event of crisis including various hotlines. Patient and family agreeing verbally to return patient to ER or call 911 if symptoms worsen or patient has urges to harm self or others. Patient is a 29 year old single unemployed non-caregiver AAF currently residing in a private residence alone. PPH Bipolar Disorder, PTSD, Cannabis Dependence. Hx of past inpatient admissions- last at [facility] x 3 weeks and discharged 8/3/20. No history of suicide attempts. Patient denies history of legal issues, + history of aggression. + cannabis dependence, no other history of substance use. No history of withdrawal or seizures. PMH Celiacs disease, migraines & recent UTI. BIBA referred by BF for manic behavior.    Patient presents to the ER in the context of manic behavior. Patient reports having issue getting her medication from pharmacy resulting in decreased sleep and irritability. Upon arrival patient was irritable and endorsed migraine symptoms and AH of ringing and visual disturbances. After sleeping she woke up and reported improvement in sleeping and denied any visual or auditory disturbances. She was calm and cooperative. Per collateral she has irritability at baseline likely character pathology. Patient did take extra dose of Zyprexa but denies it was suicidal in nature and stated she just wanted to fall asleep and had missed doses. She was understanding and educated on importance of taking medication as prescribed. She is treatment seeking, able to safety plan and future oriented. She did not present acutely psychotic, manic or depressed. Patient denied SI/HI/SIB/intent/plan. She possibly was displaying hypomanic symptoms related to intoxication and insomnia but at this time does not present this way.  She was offered and refused inpatient psychiatric admission.  She does not meet criteria for involuntary inpatient admission. Recommend discharge and patient to Follow up with [facility] 8/10/20. Prescription confirmed for  at [facility] pharmacy.     Extensive safety planning performed with patient and family. In addition to extensive discussion of safe places patient can go to, distraction techniques, coping skills, motivational interviewing and who patient can call in the event of crisis including various hotlines. Patient and family agreeing verbally to return patient to ER or call 911 if symptoms worsen or patient has urges to harm self or others.

## 2021-11-13 NOTE — ED PROVIDER NOTE - OBJECTIVE STATEMENT
60 y/o F   hx DM, Parkinson's Disease  BIBA secondary to anxiousness     Admits to  a verbal altercation with her  and thought she saw the woman he was cheating with outside the house.  Denies any physical altercations. Denies SI/HI/AH/VH. Denies pain, SOB, fever, chills, chest/ abdominal discomfort.  Denies falling, punching or kicking any objects.  No evidence of physical injures, broken skin or deformities. Denies use of alcohol. Admits to medication  compliance.

## 2021-11-13 NOTE — ED ADULT TRIAGE NOTE - CHIEF COMPLAINT QUOTE
pt reports seeing girls in her basement.  called 911 with confirmation that no one is there. denies s/i, h/i, a/h, etoh and drug use. hx anxiety, depression and parkinson. denies chest pain or sob

## 2021-11-13 NOTE — ED PROVIDER NOTE - CLINICAL SUMMARY MEDICAL DECISION MAKING FREE TEXT BOX
Medical evaluation performed. There is no clinical evidence of intoxication or any acute medical problem requiring immediate intervention.

## 2021-11-13 NOTE — ED ADULT NURSE NOTE - OBJECTIVE STATEMENT
Pt BIB EMS, EMS reports that  told [employer] that pt was seeing girls in her basement in the walls and was taking a hammer to the walls to bust them out. Pt states that she saw the girl in the basement but denies acting on anything and she states that she knows her  has been cheating on her especially after she was diagnosed with the Parkinsons.  Pt states that the  tries different ways to get her out of the house.  Pt denies being "crazy" and she's "very sharp."  Pt also denies current SI/HI, AH/VH, ETOH, and substance use.  Pt also carries diagnoses of anxiety and depression.

## 2021-11-15 ENCOUNTER — RX RENEWAL (OUTPATIENT)
Age: 61
End: 2021-11-15

## 2021-11-23 ENCOUNTER — APPOINTMENT (OUTPATIENT)
Dept: NEUROLOGY | Facility: CLINIC | Age: 61
End: 2021-11-23
Payer: MEDICAID

## 2021-11-23 VITALS
DIASTOLIC BLOOD PRESSURE: 83 MMHG | HEART RATE: 83 BPM | BODY MASS INDEX: 25.34 KG/M2 | WEIGHT: 143 LBS | HEIGHT: 63 IN | SYSTOLIC BLOOD PRESSURE: 141 MMHG

## 2021-11-23 PROCEDURE — 99072 ADDL SUPL MATRL&STAF TM PHE: CPT

## 2021-11-23 PROCEDURE — 99214 OFFICE O/P EST MOD 30 MIN: CPT

## 2021-11-23 NOTE — DISCUSSION/SUMMARY
[FreeTextEntry1] : PD with rapid fluctuations and psychosis. I am limited in my ability to raise LEDD due to her uncontrolled psychiatric state. \par neck mass of unknown etiology\par \par Patient was counseled on the following recommendations:\par \par - inc 2nd and 5th doses of LD to 2 tabs \par - leave rest of the regimen the same\par - patient plans to see a new psychiatrist at [facility] to determine change in psychotropic regimen\par \par f/u 2months

## 2021-11-23 NOTE — PHYSICAL EXAM
[FreeTextEntry1] : Speech is normal. minimal masking. There is no tremor dyskinesia. Tone is normal. 1+ L>R bradykinesia. Gait is normal. There is mild generalized LID. \par \par There is an indurated anterior neck mass near clavicular head.

## 2021-11-23 NOTE — HISTORY OF PRESENT ILLNESS
[FreeTextEntry1] : She states that the first dose of the morning last 2hrs while the other LD doses last about 2.5 hrs. She freezes during the wearing off periods. Takes 5 doses LD daily but changes dosing interval daily. Morning and evening off periods are particularly bothersome. \par Patient is planning to change psychiatrist \par Neck mass is being worked up\par She c/o night sweats and thyroid function is abnormal per her PCP\par Daughters report worsening of hallucinations and paranoia. She screams at what she is seeing and lacks insights\par needs assistance with IADLs\par \par nonmotor sxms: \par +RBD treated with klonopin\par Sleeping well\par no constipation\par No OH\par \par Meds:\par c/l 25/100 1.5tabs 5 times per day (q2.5-3hrs) \par comtan 1  tab 5 times per day\par sinemet CR 50/200 1 tab qhs\par seroquel 200mg qhs\par klonopin 0.5mg qhs\par nuplazid 34mg qd\par \par prior meds\par requip \par azilect \par \par Srinath nelson [phone]\par \par

## 2021-11-30 ENCOUNTER — RX RENEWAL (OUTPATIENT)
Age: 61
End: 2021-11-30

## 2021-12-30 ENCOUNTER — NON-APPOINTMENT (OUTPATIENT)
Age: 61
End: 2021-12-30

## 2022-01-05 ENCOUNTER — NON-APPOINTMENT (OUTPATIENT)
Age: 62
End: 2022-01-05

## 2022-01-13 ENCOUNTER — NON-APPOINTMENT (OUTPATIENT)
Age: 62
End: 2022-01-13

## 2022-02-03 ENCOUNTER — RX RENEWAL (OUTPATIENT)
Age: 62
End: 2022-02-03

## 2022-02-11 ENCOUNTER — APPOINTMENT (OUTPATIENT)
Dept: NEUROLOGY | Facility: CLINIC | Age: 62
End: 2022-02-11
Payer: MEDICAID

## 2022-02-11 VITALS
RESPIRATION RATE: 16 BRPM | HEART RATE: 68 BPM | HEIGHT: 63 IN | DIASTOLIC BLOOD PRESSURE: 80 MMHG | WEIGHT: 137 LBS | SYSTOLIC BLOOD PRESSURE: 139 MMHG | BODY MASS INDEX: 24.27 KG/M2

## 2022-02-11 PROCEDURE — 99214 OFFICE O/P EST MOD 30 MIN: CPT

## 2022-02-11 RX ORDER — PIMAVANSERIN TARTRATE 34 MG/1
34 CAPSULE ORAL
Qty: 30 | Refills: 6 | Status: DISCONTINUED | COMMUNITY
Start: 2020-11-02 | End: 2022-02-11

## 2022-02-11 NOTE — HISTORY OF PRESENT ILLNESS
[FreeTextEntry1] : Patient reports that she stopped nuplazid at the end of dec due to insurance changes. She says that she hallucinates less and dtr notes that patient is calmer. However, she continues to have delusions daily and believes that there are people in her house. She has new olfactory hallucinations. \par She also is having panic attacks triggered by olfactory hallucinations. \par She has minimal off periods \par does not have overnight akinesia\par There are days when she has very little delusions and hallucinations\par \par nonmotor sxms: \par +RBD treated with klonopin\par Sleeping well\par no constipation\par No OH\par \par Meds:\par c/l 25/100 1.5tabs - 1.5-1.5-2-2\par comtan 1  tab 5 times per day\par sinemet CR 50/200 1 tab qhs\par seroquel 200mg qhs\par klonopin 0.5mg qhs\par \par \par prior meds\par requip \par azilect \par nuplazid \par \par Srinath nelson [phone]\par \par

## 2022-02-11 NOTE — PHYSICAL EXAM
[FreeTextEntry1] : Speech is normal. minimal masking. There is no tremor dyskinesia. Tone is normal. 1+ L>R bradykinesia. Gait is normal. LID well controlled.

## 2022-02-11 NOTE — DISCUSSION/SUMMARY
[FreeTextEntry1] : PD with controlled fluctuations. Psychosis with delusions/hallucinations persist now with new olfactory hallucinations and panic attacks\par \par Patient was counseled on the following recommendations:\par \par - leave PD regimen the same\par - check EEG to ensure no seizure activity. Suspicion low as this is likely related to PD psychosis\par - patient planning to est. care at [facility]\par \par f/u 3months

## 2022-02-18 ENCOUNTER — APPOINTMENT (OUTPATIENT)
Dept: NEUROLOGY | Facility: CLINIC | Age: 62
End: 2022-02-18
Payer: MEDICAID

## 2022-02-18 PROCEDURE — 95816 EEG AWAKE AND DROWSY: CPT

## 2022-02-22 ENCOUNTER — NON-APPOINTMENT (OUTPATIENT)
Age: 62
End: 2022-02-22

## 2022-02-24 ENCOUNTER — NON-APPOINTMENT (OUTPATIENT)
Age: 62
End: 2022-02-24

## 2022-03-01 ENCOUNTER — OUTPATIENT (OUTPATIENT)
Dept: OUTPATIENT SERVICES | Facility: HOSPITAL | Age: 62
LOS: 1 days | Discharge: STILL ADMITTED OR ADMINISTRATIVE | End: 2022-03-01
Payer: MEDICAID

## 2022-03-01 PROCEDURE — 99215 OFFICE O/P EST HI 40 MIN: CPT

## 2022-03-02 DIAGNOSIS — G20 PARKINSON'S DISEASE: ICD-10-CM

## 2022-03-31 PROCEDURE — 99214 OFFICE O/P EST MOD 30 MIN: CPT | Mod: 95

## 2022-04-22 ENCOUNTER — APPOINTMENT (OUTPATIENT)
Dept: NEUROLOGY | Facility: CLINIC | Age: 62
End: 2022-04-22
Payer: MEDICAID

## 2022-04-22 ENCOUNTER — APPOINTMENT (OUTPATIENT)
Dept: NEUROLOGY | Facility: CLINIC | Age: 62
End: 2022-04-22

## 2022-04-22 VITALS
HEIGHT: 63 IN | DIASTOLIC BLOOD PRESSURE: 70 MMHG | BODY MASS INDEX: 26.58 KG/M2 | HEART RATE: 78 BPM | WEIGHT: 150 LBS | SYSTOLIC BLOOD PRESSURE: 117 MMHG

## 2022-04-22 PROCEDURE — 99214 OFFICE O/P EST MOD 30 MIN: CPT

## 2022-04-25 NOTE — HISTORY OF PRESENT ILLNESS
[FreeTextEntry1] : Patient reports that the first dose of LD is not lasting more than 2hrs. \par She recently started the exelon TD and tolerating.  \par She continues to have visual hallucinations throughout the day with limited insight into them. Hallucinations accompanied by paranoid delusions. Despite this, she is able to do all ADLs independently and attends [employer] regularly. \par Psychiatrist has not changed her regimen lately. She is looking for a new therapist\par [place] due to a rapid off period. \par EEG was normal\par \par nonmotor sxms: \par +RBD treated with klonopin\par no constipation\par No OH\par \par Meds:\par c/l 25/100     1.5tabs - 1.5-1.5-1.5-1.5 (815AM - 1030A- 12p - 2p- 5p)\par comtan 1  tab 5 times per day\par sinemet CR 50/200 1 tab qhs\par seroquel 200mg qhs\par klonopin 0.5mg qhs\par exelon 4.6mg TD\par \par prior meds\par requip \par azilect \par nuplazid \par \par Srinath nelson [phone]\par \par

## 2022-04-25 NOTE — DISCUSSION/SUMMARY
[FreeTextEntry1] : PD with increased wearing offs. 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 \par \par Patient was counseled on the following recommendations:\par \par - add 6th dose of sinemet 1.5 tabs at 730P. Take CR at 11P when going to bed\par - patient planning to est. care at [facility]\par \par f/u 3months. \par \par

## 2022-05-13 ENCOUNTER — APPOINTMENT (OUTPATIENT)
Dept: NEUROLOGY | Facility: CLINIC | Age: 62
End: 2022-05-13

## 2022-05-18 ENCOUNTER — RX RENEWAL (OUTPATIENT)
Age: 62
End: 2022-05-18

## 2022-07-05 ENCOUNTER — APPOINTMENT (OUTPATIENT)
Dept: NEUROLOGY | Facility: CLINIC | Age: 62
End: 2022-07-05

## 2022-07-05 VITALS
BODY MASS INDEX: 26.58 KG/M2 | WEIGHT: 150 LBS | SYSTOLIC BLOOD PRESSURE: 113 MMHG | HEIGHT: 63 IN | HEART RATE: 81 BPM | DIASTOLIC BLOOD PRESSURE: 75 MMHG

## 2022-07-05 PROCEDURE — 99214 OFFICE O/P EST MOD 30 MIN: CPT

## 2022-07-05 RX ORDER — RASAGILINE 1 MG/1
1 TABLET ORAL DAILY
Qty: 30 | Refills: 3 | Status: COMPLETED | COMMUNITY
Start: 2021-06-02 | End: 2022-07-05

## 2022-07-05 NOTE — DISCUSSION/SUMMARY
[FreeTextEntry1] : PD with motor fluctuations, evening bothersome dyskinesia and delusions/VH related to LD. 6 times per day dosing is managing her motor symptoms well, but high LEDD is certainly exacerbating psych state. \par \par Patient was counseled on the following recommendations:\par \par - trial of stopping 7pm comtan or reducing sinemet ER 50./200 to 1/2 tab\par - leave PD regimen the same\par - f/u with psych\par \par f./u 3months\par

## 2022-07-05 NOTE — HISTORY OF PRESENT ILLNESS
[FreeTextEntry1] : Hallucinations have worsened since last visit. She is taking 6 doses of LD. Her hallucinations and delusions are directed toward her  primarily.  Planning to est care with a new psychiatrist\par She has some wearing offs and no dose failures\par Denies any falls\par Remains independent to all  ADLs\par She is compulsively eating sweats throughout the day. \par \par \par nonmotor sxms: \par +RBD treated with klonopin\par no constipation\par No OH\par \par Meds:\par c/l 25/100     1.5tabs - 1.5-1.5-1.5-1.5 (815AM - 1030A- 12p - 2p- 5p-7p)\par comtan 1  tab 5 times per day\par sinemet CR 50/200 1 tab qhs\par seroquel 200mg qhs\par klonopin 0.5mg qhs\par exelon 4.6mg TD\par \par prior meds\par requip \par azilect \par nuplazid \par \par Srinath nelson [phone]\par \par

## 2022-07-05 NOTE — PHYSICAL EXAM
[FreeTextEntry1] : Speech is normal. minimal masking. Mild generalized dyskinesia. Tone is normal. 1+ L>R bradykinesia. Gait is normal. LID well controlled.

## 2022-08-02 ENCOUNTER — RX RENEWAL (OUTPATIENT)
Age: 62
End: 2022-08-02

## 2022-09-25 ENCOUNTER — RX RENEWAL (OUTPATIENT)
Age: 62
End: 2022-09-25

## 2022-10-19 ENCOUNTER — APPOINTMENT (OUTPATIENT)
Dept: NEUROLOGY | Facility: CLINIC | Age: 62
End: 2022-10-19

## 2022-10-19 VITALS
HEIGHT: 63 IN | WEIGHT: 140 LBS | SYSTOLIC BLOOD PRESSURE: 140 MMHG | BODY MASS INDEX: 24.8 KG/M2 | DIASTOLIC BLOOD PRESSURE: 84 MMHG | HEART RATE: 84 BPM

## 2022-10-19 PROCEDURE — 99214 OFFICE O/P EST MOD 30 MIN: CPT

## 2022-10-19 RX ORDER — QUETIAPINE FUMARATE 25 MG/1
25 TABLET ORAL
Qty: 30 | Refills: 3 | Status: COMPLETED | COMMUNITY
Start: 2020-08-19 | End: 2022-10-19

## 2022-10-19 RX ORDER — SERTRALINE HYDROCHLORIDE 50 MG/1
50 TABLET, FILM COATED ORAL DAILY
Refills: 0 | Status: COMPLETED | COMMUNITY
Start: 2021-07-07 | End: 2022-10-19

## 2022-10-19 NOTE — DISCUSSION/SUMMARY
[FreeTextEntry1] : PD with motor fluctuations, evening bothersome  delusions/VH related to LD. FoG has increased in the off state but remains responsive to levodopa. Due to lack of psychiatric care as well as worsening of her psych symptoms, I decided to initiate abilify 2mg qhs. Will stop the exelon patch at this time as it has not provided any benefit with respect to her hallucinations. \par \par She will stay on her other PD meds and i requested that she get a new EKG at her PCP to check QT interval. Her last EKG was reportedly several months ago and was deemed to be normal\par \par f/u 3months\par

## 2022-10-19 NOTE — PHYSICAL EXAM
[FreeTextEntry1] : Speech is normal. minimal masking. Mild generalized dyskinesia. Tone is normal. 1+ L>R bradykinesia. There is start freezing on exam with marked step-step variability c/b freezing.

## 2022-10-19 NOTE — HISTORY OF PRESENT ILLNESS
[FreeTextEntry1] : Patient reports having 3 off periods daily lasting 1 hr\par They do not occur the same time everyday; however, she often changes dose timing. She has considerable freezing in the of state and it takes 30-60mins for her sinemet to take effect. \par Visual hallucinations persist with paranoid delusions. She continues to believe that her  has imprisoned people in the walls of the basement. These symptoms increase in the late afternoons and evenings. \par  tells me that patient uses flash light to look for people in the basement every night\par Patient has been unable to est care with another psychiatrist and currently not seeing anyone\par Overnight, she can experience pain in her legs with some cramping of the feet\par \par nonmotor sxms: \par +RBD treated with klonopin\par no constipation\par No OH\par \par Meds:\par c/l 25/100 1.5tabs - 1.5-1.5-1.5-1.5 (815AM - 1030A- 12p - 2p- 5p-7p)\par comtan 1  tab 6 times per day\par sinemet CR 50/200 1 tab qhs\par seroquel 200mg qhs\par klonopin 0.5mg qhs\par exelon 4.6mg TD\par \par prior meds\par requip \par azilect \par nuplazid \par \par Srinath nelson [phone]\par \par

## 2022-10-21 ENCOUNTER — NON-APPOINTMENT (OUTPATIENT)
Age: 62
End: 2022-10-21

## 2022-11-03 ENCOUNTER — RX RENEWAL (OUTPATIENT)
Age: 62
End: 2022-11-03

## 2022-12-01 ENCOUNTER — NON-APPOINTMENT (OUTPATIENT)
Age: 62
End: 2022-12-01

## 2022-12-02 ENCOUNTER — NON-APPOINTMENT (OUTPATIENT)
Age: 62
End: 2022-12-02

## 2023-02-24 ENCOUNTER — RX RENEWAL (OUTPATIENT)
Age: 63
End: 2023-02-24

## 2023-02-28 ENCOUNTER — RX RENEWAL (OUTPATIENT)
Age: 63
End: 2023-02-28

## 2023-03-14 ENCOUNTER — RX RENEWAL (OUTPATIENT)
Age: 63
End: 2023-03-14

## 2023-03-16 ENCOUNTER — APPOINTMENT (OUTPATIENT)
Dept: NEUROLOGY | Facility: CLINIC | Age: 63
End: 2023-03-16
Payer: MEDICAID

## 2023-03-16 VITALS — SYSTOLIC BLOOD PRESSURE: 137 MMHG | DIASTOLIC BLOOD PRESSURE: 74 MMHG | HEART RATE: 92 BPM

## 2023-03-16 PROCEDURE — 99214 OFFICE O/P EST MOD 30 MIN: CPT

## 2023-03-16 NOTE — HISTORY OF PRESENT ILLNESS
[FreeTextEntry1] : Patient reports having 3 off periods daily lasting 1 hr. Overnight she reports painful leg cramps, takes Tylenol regularly. She is performing all ADLs independently. Off periods are sporadic and can last up to 1hr but is not completely immobile during them. \par \par Visual hallucinations persist with paranoid delusions. She continues to believe that her  has imprisoned people in the basement. These symptoms increase in the late afternoons and evenings. Patient has been unable to est care with another psychiatrist and currently not seeing anyone. Dtr thinks the intensity of the paranoia has lessened since the last visit\par \par EKG was done by PCP recently and was reportedly normal\par \par nonmotor sxms: \par +RBD treated with klonopin\par no constipation\par No OH\par \par \par Meds:\par c/l 25/100 1.5tabs - 1.5-1.5-1.5-1.5 (815AM - 1030A- 12p - 2p- 5p-7p)\par comtan 1  tab 6 times per day\par sinemet CR 50/200 1 tab qhs\par Abilify 2mg qhs\par seroquel 200mg qhs\par klonopin 0.5mg qhs\par exelon 4.6mg TD\par \par prior meds\par requip \par azilect \par nuplazid- no improvement\par \par Srinath nelson [phone]\par \par

## 2023-03-16 NOTE — DISCUSSION/SUMMARY
[FreeTextEntry1] : PD with motor fluctuations, evening bothersome  delusions/VH related to LD. . Due to lack of psychiatric care as well as worsening of her psych symptoms, I decided to inc abilify to 4mg qhs. \par \par She will stay on her other PD meds.\par \par f/u 3months\par

## 2023-03-31 ENCOUNTER — RX RENEWAL (OUTPATIENT)
Age: 63
End: 2023-03-31

## 2023-04-25 ENCOUNTER — RX RENEWAL (OUTPATIENT)
Age: 63
End: 2023-04-25

## 2023-04-26 ENCOUNTER — NON-APPOINTMENT (OUTPATIENT)
Age: 63
End: 2023-04-26

## 2023-05-11 ENCOUNTER — RX RENEWAL (OUTPATIENT)
Age: 63
End: 2023-05-11

## 2023-06-14 ENCOUNTER — RX RENEWAL (OUTPATIENT)
Age: 63
End: 2023-06-14

## 2023-07-03 ENCOUNTER — RX RENEWAL (OUTPATIENT)
Age: 63
End: 2023-07-03

## 2023-07-12 ENCOUNTER — NON-APPOINTMENT (OUTPATIENT)
Age: 63
End: 2023-07-12

## 2023-08-17 ENCOUNTER — NON-APPOINTMENT (OUTPATIENT)
Age: 63
End: 2023-08-17

## 2023-09-01 RX ORDER — CARBIDOPA AND LEVODOPA 50; 200 MG/1; MG/1
50-200 TABLET, EXTENDED RELEASE ORAL
Qty: 90 | Refills: 3 | Status: ACTIVE | COMMUNITY
Start: 2020-08-06 | End: 1900-01-01

## 2023-09-19 ENCOUNTER — APPOINTMENT (OUTPATIENT)
Dept: NEUROLOGY | Facility: CLINIC | Age: 63
End: 2023-09-19

## 2023-10-17 ENCOUNTER — APPOINTMENT (OUTPATIENT)
Dept: NEUROLOGY | Facility: CLINIC | Age: 63
End: 2023-10-17

## 2023-11-20 ENCOUNTER — RX RENEWAL (OUTPATIENT)
Age: 63
End: 2023-11-20

## 2023-11-22 ENCOUNTER — RX RENEWAL (OUTPATIENT)
Age: 63
End: 2023-11-22

## 2023-12-19 ENCOUNTER — APPOINTMENT (OUTPATIENT)
Dept: NEUROLOGY | Facility: CLINIC | Age: 63
End: 2023-12-19

## 2024-01-02 ENCOUNTER — APPOINTMENT (OUTPATIENT)
Dept: NEUROLOGY | Facility: CLINIC | Age: 64
End: 2024-01-02
Payer: MEDICAID

## 2024-01-02 VITALS
SYSTOLIC BLOOD PRESSURE: 134 MMHG | DIASTOLIC BLOOD PRESSURE: 84 MMHG | BODY MASS INDEX: 24.45 KG/M2 | WEIGHT: 138 LBS | HEIGHT: 63 IN | HEART RATE: 76 BPM

## 2024-01-02 PROCEDURE — 99215 OFFICE O/P EST HI 40 MIN: CPT

## 2024-01-02 NOTE — HISTORY OF PRESENT ILLNESS
[FreeTextEntry1] : Patient states Sinemet begins to take effect after 1 hour and begins to wear off after 2 hours.   Visual hallucinations persist with paranoid delusions. She continues to believe that her  has imprisoned people in the basement. These symptoms increase in the late afternoons and evenings. She feels heaviness and immobility at times and thinks it is due to someone holding her down.   She has a psychiatrist through the [facility] sees her on a monthly basis. She is currently titrating risperidone slowly.   Patient falls often due to FOG. Her last fall was yesterday. She would benefit from using a walker. Not currently doing PT.   nonmotor sxms:  +RBD and insomnia  Denies constipation No OH   Meds: c/l 25/100 1.5tabs - 1.5-1.5-1.5-2-2 (815AM - 1030A- 12p - 2p- 5p-7p) comtan 1/2 tab 6 times per day sinemet CR 50/200 1 tab qhs- risperidone 0.25mg qhs melatonin PRN   prior meds requip  azilect  nuplazid- no improvement exelon abilify

## 2024-01-02 NOTE — DISCUSSION/SUMMARY
[FreeTextEntry1] : PD with motor fluctuations, evening bothersome  delusions/VH related to LD. Severe FOG and frequent falls   Patient was counseled on the following recommendations  f/u with psychiatry continue LD regimen ordered rolling walker and wheelchair refer to PT for balance and gait training   f/u 4 months

## 2024-01-02 NOTE — PHYSICAL EXAM
[FreeTextEntry1] : +2 Facial hypomimia, reduced blink rate Vertical eye movements intact without square wave jerks +1 Hypophonic speech +2 Rigidity of neck rotation 0 Rigidity of limbs present with contralateral activation  0 Resting tremor 0 Action tremor 0 Postural tremor  +1 Bradykinesia with finger tapping, hand supination/pronation, foot tapping, foot stomping. No dysmetria with finger to nose Gait: able to stand with hands crossed and without assistance +2  posture Slow gait initiation, decreased stride length, reduced arm swing, +4 freezing of gait upon turning, requires multiple steps with turning. negative pull test

## 2024-01-22 RX ORDER — ARIPIPRAZOLE 2 MG/1
2 TABLET ORAL DAILY
Qty: 60 | Refills: 5 | Status: DISCONTINUED | COMMUNITY
Start: 2022-10-19 | End: 2024-01-22

## 2024-01-22 RX ORDER — RIVASTIGMINE 4.6 MG/24H
4.6 PATCH, EXTENDED RELEASE TRANSDERMAL DAILY
Qty: 30 | Refills: 0 | Status: DISCONTINUED | COMMUNITY
Start: 2022-08-03 | End: 2024-01-22

## 2024-02-15 ENCOUNTER — APPOINTMENT (OUTPATIENT)
Dept: NEUROLOGY | Facility: CLINIC | Age: 64
End: 2024-02-15

## 2024-02-20 ENCOUNTER — APPOINTMENT (OUTPATIENT)
Dept: NEUROLOGY | Facility: CLINIC | Age: 64
End: 2024-02-20
Payer: MEDICAID

## 2024-02-20 VITALS
WEIGHT: 139 LBS | HEART RATE: 64 BPM | DIASTOLIC BLOOD PRESSURE: 61 MMHG | BODY MASS INDEX: 24.63 KG/M2 | SYSTOLIC BLOOD PRESSURE: 98 MMHG | HEIGHT: 63 IN

## 2024-02-20 DIAGNOSIS — F51.04 PSYCHOPHYSIOLOGIC INSOMNIA: ICD-10-CM

## 2024-02-20 PROCEDURE — 99214 OFFICE O/P EST MOD 30 MIN: CPT

## 2024-02-20 PROCEDURE — G2211 COMPLEX E/M VISIT ADD ON: CPT | Mod: NC,1L

## 2024-02-21 PROBLEM — F51.04 CHRONIC INSOMNIA: Status: ACTIVE | Noted: 2024-02-21

## 2024-02-21 RX ORDER — ENTACAPONE 200 MG/1
200 TABLET, FILM COATED ORAL
Qty: 150 | Refills: 5 | Status: COMPLETED | COMMUNITY
Start: 2020-08-04 | End: 2024-02-21

## 2024-02-21 NOTE — DISCUSSION/SUMMARY
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  sleep fragmentation Neck mass  Patient was counseled on the following recommendations: - d/c comtan - If OFF periods increase, will raise all doses of sinemet to 2 tabs - start trazodone 25-50mg hs for insomnia - f/u psych as risperdal regimen needs to be increased - f/u SW  f/u 3months

## 2024-02-21 NOTE — HISTORY OF PRESENT ILLNESS
[FreeTextEntry1] :  She is performing all ADLs independently. Off periods are sporadic and can last up to 2hr/d Patient remains very delusional. Believes people are poisoning her and sees them in her basement. Wants to break down the wall in the basement to prove they are there.  Will be meeting with SW soon to explore additional mental health services She is seeing a new psychiatrist who recently started her on risperdal and stopped seroquel. As a result her sleep is more disrupted and melatonin 10mg has not helped.  Dtr reduced comtan to 1/2 tabs with ? reduction in hallucinations.   nonmotor sxms:  +RBD treated with klonopin no constipation No OH  Meds: c/l 25/100 1.5tabs - 1.5-1.5-2-2 (815AM - 1030A- 12p - 2p- 5p-7p) comtan 1/2  tab 6 times per day sinemet CR 50/200 1 tab qhs risperdal 0.5mg qd klonopin 0.5mg qhs   prior meds requip  azilect  nuplazid- no improvement seroquel jose l nelson [phone]

## 2024-02-21 NOTE — PHYSICAL EXAM
[Person] : oriented to person [Place] : oriented to place [Time] : oriented to time [FreeTextEntry1] : neck mass in anterior aspect

## 2024-03-12 ENCOUNTER — NON-APPOINTMENT (OUTPATIENT)
Age: 64
End: 2024-03-12

## 2024-03-15 ENCOUNTER — RX RENEWAL (OUTPATIENT)
Age: 64
End: 2024-03-15

## 2024-03-28 ENCOUNTER — APPOINTMENT (OUTPATIENT)
Dept: NEUROLOGY | Facility: CLINIC | Age: 64
End: 2024-03-28

## 2024-04-02 ENCOUNTER — APPOINTMENT (OUTPATIENT)
Dept: NEUROLOGY | Facility: CLINIC | Age: 64
End: 2024-04-02

## 2024-04-17 DIAGNOSIS — R51.9 HEADACHE, UNSPECIFIED: ICD-10-CM

## 2024-04-18 ENCOUNTER — OUTPATIENT (OUTPATIENT)
Dept: OUTPATIENT SERVICES | Facility: HOSPITAL | Age: 64
LOS: 1 days | End: 2024-04-18
Payer: MEDICAID

## 2024-04-18 ENCOUNTER — APPOINTMENT (OUTPATIENT)
Dept: CT IMAGING | Facility: IMAGING CENTER | Age: 64
End: 2024-04-18
Payer: MEDICAID

## 2024-04-18 DIAGNOSIS — Z00.8 ENCOUNTER FOR OTHER GENERAL EXAMINATION: ICD-10-CM

## 2024-04-18 DIAGNOSIS — R51.9 HEADACHE, UNSPECIFIED: ICD-10-CM

## 2024-04-18 PROCEDURE — 70450 CT HEAD/BRAIN W/O DYE: CPT

## 2024-04-18 PROCEDURE — 70450 CT HEAD/BRAIN W/O DYE: CPT | Mod: 26

## 2024-04-19 ENCOUNTER — APPOINTMENT (OUTPATIENT)
Dept: NEUROLOGY | Facility: CLINIC | Age: 64
End: 2024-04-19
Payer: MEDICAID

## 2024-04-19 VITALS
WEIGHT: 130 LBS | HEART RATE: 73 BPM | BODY MASS INDEX: 23.04 KG/M2 | HEIGHT: 63 IN | SYSTOLIC BLOOD PRESSURE: 120 MMHG | DIASTOLIC BLOOD PRESSURE: 75 MMHG

## 2024-04-19 PROCEDURE — 99215 OFFICE O/P EST HI 40 MIN: CPT

## 2024-05-02 NOTE — ED PROVIDER NOTE - CPE EDP GASTRO NORM
Thank you for choosing us for your care. I have placed an order for a prescription so that you can start treatment. View your full visit summary for details by clicking on the link below. Your pharmacist will able to address any questions you may have about the medication.     If you're not feeling better within 5-7 days, please schedule an appointment.  You can schedule an appointment right here in [facility], or call [phone]  If the visit is for the same symptoms as your eVisit, we'll refund the cost of your eVisit if seen within seven days.     - - -

## 2024-06-13 ENCOUNTER — RX RENEWAL (OUTPATIENT)
Age: 64
End: 2024-06-13

## 2024-06-13 RX ORDER — TRAZODONE HYDROCHLORIDE 50 MG/1
50 TABLET ORAL
Qty: 30 | Refills: 6 | Status: ACTIVE | COMMUNITY
Start: 2024-02-21 | End: 1900-01-01

## 2024-06-24 NOTE — ED BEHAVIORAL HEALTH ASSESSMENT NOTE - DOMICILED WITH
Family Render In Strict Bullet Format?: No Initiate Treatment: Skin smart spray to reduce the formation of pustules patient may be experiencing \\n\\ndapsone 7.5 % topical gel with pump (Apply a thin layer to the face once daily)-patient never received-will resend, advised patient to call if not received Detail Level: Zone Continue Regimen: Compound face wash with sodium sulfacetamide \\nTretinion compound lotion at night with azelaic acid\\n\\nVanicream vitamin C serum\\nPimple patches at night\\n\\nCerave healing ointment to open sores

## 2024-06-25 ENCOUNTER — APPOINTMENT (OUTPATIENT)
Dept: NEUROLOGY | Facility: CLINIC | Age: 64
End: 2024-06-25
Payer: MEDICAID

## 2024-06-25 PROCEDURE — 99214 OFFICE O/P EST MOD 30 MIN: CPT

## 2024-06-25 RX ORDER — CARBIDOPA AND LEVODOPA 25; 100 MG/1; MG/1
25-100 TABLET ORAL
Qty: 900 | Refills: 3 | Status: ACTIVE | COMMUNITY
Start: 2018-12-19 | End: 1900-01-01

## 2024-07-10 NOTE — BEHAVIORAL HEALTH ASSESSMENT NOTE - DESCRIPTION
Virtual Visit Details  Type of service:  Video Visit   Originating Location (pt. Location): Home  Distant Location (provider location):  On-site  Platform used for Video Visit: [facility]    Oncology/Hematology Visit Note  Jul 10, 2024    Reason for Visit: follow up of esophageal cancer    History of Present Illness: Linh Mustafa is a 65 year old female with esophageal cancer. Since November 2023 she started to notice pain upon swallowing and it was basically pain which limited her food intake.  This was progressively getting worse.  She had further workup as mentioned below.     2/15/2024.  EGD showed an ulcerated mid esophageal mass extending from 26-31 cm from the incisors.  There was a short segment Auguste's esophagus from 37-40 cm (biopsy-proven).  Pathology from the mid esophageal mass showed moderately differentiated invasive squamous cell carcinoma, MMR IHC intact.     3/7/2024.  PET/CT showed markedly FDG avid wall thickening of the mid thoracic esophagus with SUV max 24.1.  No evidence of metastatic disease.     3/21/2024.  Upper EUS.  Endoscopy showed a flat nodule in the proximal esophagus between 15-19 cm and one third circumferential.  Upper esophageal sphincter was at 14 cm.  Biopsies from this showed high-grade dysplasia/carcinoma in situ.       Normal esophageal squamous cell cancer causing maybe esophagus on the right anterolateral esophageal wall between 24-30 cm.  It was 50% circumferential.  The GE junction was at 35 cm with 2 cm tongues of Auguste's anteriorly without high risk features.     Ultrasound exam showed the mid esophageal tumor to be probably T3. Two 4 x 6 mm nodes were seen adjacent to the distal esophageal wall at 36 and 37 cm.  Both an identical appearance and one was sampled.  This lymph node biopsy was benign.     By EUS it was staged as T3 N0 M0 tumor.     4/2/2024.  Because of finding of carcinoma in situ in the proximal esophagus, she had endoscopic resection of the  proximal squamous cell carcinoma in situ lesion performed with en bloc removal.    The final pathology showed squamous mucosa with high-grade dysplasia/carcinoma in situ with all margins negative for dysplasia.  No definite invasion was identified. ESD was considered curative for this lesion.     Case was also discussed in the tumor conference with the plan to proceed with neoadjuvant chemotherapy/radiation followed by definitive surgery.       She started on concurrent chemoradiation with carboplatin and Taxol on 5/1/24 and received 5 doses, last on 5/28/24 and last dose of radiation on 6/5/24.    She was hospitalized from 6/4-6/18/24 with radiation esophagitis causing pain and inability to eat. She was discharged home with a G-tube that was placed on 6/14/24.     Please see previous notes for further details on the patient's history. She comes in today for routine follow-up.     Interval History:   -Doing okay overall.  -Taking hydroxyzine 25 mg tid with improvement in anxiety.   -Has a dull ache on left side on abdomen with no tenderness.   -Now on 12.5 mcg Fentanyl and oxycodone 10 mg every 3-4 hours.   -Bowels are working well without medication.   -Eating some soft foods daily. Esophageal pain is improving.   -Using 4 cartons/day of her tube feeds.   -Now weighs 136 pounds.   -Now sleeping about 14 out of 24 hours. Energy is a little better.   -Drinking 16 oz water/day and 60 oz/day with flushes.   -Denies any nausea with regular use of Zofran and Compazine.   -Denies any smoking.   -Did not resume famotidine, but not having heartburn.   -Doing home PT once/week and goes for a daily walk.     Current Outpatient Medications   Medication Sig Dispense Refill    acetaminophen (TYLENOL) 325 MG/10.15ML liquid Take 31.25 mLs (1,000 mg) by mouth 3 times daily 2812.5 mL 1    artificial saliva (BIOTENE DRY MOUTHWASH) LIQD liquid Swish and spit 15 mLs in mouth 4 times daily as needed for dry mouth 473 mL 0    famotidine  (PEPCID) 20 MG tablet Take 20 mg by mouth 2 times daily      fentaNYL (DURAGESIC) 12 mcg/hr 72 hr patch Place 1 patch onto the skin every 72 hours remove old patch. 10 patch 0    fentaNYL (DURAGESIC) 25 mcg/hr 72 hr patch Place 1 patch onto the skin every 72 hours remove old patch. (Patient not taking: Reported on 7/10/2024) 10 patch 0    hydrOXYzine HCl (ATARAX) 25 MG tablet Take 1 tablet (25 mg) by mouth 3 times daily as needed for itching      levothyroxine (SYNTHROID/LEVOTHROID) 200 MCG tablet Take 200 mcg by mouth daily      methocarbamol (ROBAXIN) 750 MG tablet Take 1 tablet (750 mg) by mouth 4 times daily 120 tablet 0    naloxone (NARCAN) 4 MG/0.1ML nasal spray Spray 1 spray (4 mg) into one nostril alternating nostrils as needed for opioid reversal every 2-3 minutes until assistance arrives 0.2 mL 1    ondansetron (ZOFRAN ODT) 8 MG ODT tab Take 1 tablet (8 mg) by mouth every 8 hours as needed for nausea 90 tablet 0    oxyCODONE (ROXICODONE) 5 MG/5ML solution 7.5-10 mLs (7.5-10 mg) by Oral or Feeding Tube route every 3 hours as needed for severe pain      prochlorperazine (COMPAZINE) 10 MG tablet Take 1 tablet (10 mg) by mouth every 6 hours as needed for vomiting 90 tablet 0    vitamin B-12 (CYANOCOBALAMIN) 2500 MCG sublingual tablet Take 1 tablet (2,500 mcg) by mouth daily 90 tablet 3     Objective:  General: patient appears well in no acute distress, alert and oriented, speech clear and fluid, thin hair on scalp  Skin: no visualized rash or lesions on visualized skin  Resp: Appears to be breathing comfortably without accessory muscle usage, speaking in full sentences, no audible wheezes or cough.  Psych: Coherent speech, normal rate and volume, able to articulate logical thoughts, able to abstract reason, no tangential thoughts, no hallucinations or delusions  Patient's affect is appropriate.    Laboratory Data:  Most Recent 3 CBC's:  Recent Labs   Lab Test 07/09/24  1545 06/26/24  0935 06/17/24  0718   WBC  6.4 6.5 4.0   HGB 9.7* 8.8* 8.7*   * 104* 103*    362 284   ANEUTAUTO 4.6 5.1 2.6    Most Recent 3 BMP's:  Recent Labs   Lab Test 07/09/24  1545 06/26/24  0935 06/17/24  0718 06/06/24  0729 06/06/24  0634    136 135   < > 136   POTASSIUM 4.9 4.7 4.8   < > 3.4   CHLORIDE 100 98 98   < > 99   CO2 26 27 28   < > 24   BUN 26.2* 17.6 12.5   < > 9.8   CR 0.94 0.72 0.65   < > 0.56   ANIONGAP 9 11 9   < > 13   VAIBHAV 10.4* 10.3* 10.3*   < > 10.5*   * 101* 94   < > 94   PROTTOTAL 7.6 7.3  --   --  7.3   ALBUMIN 4.2 3.8  --   --  4.0    < > = values in this interval not displayed.    Most Recent 2 LFT's:  Recent Labs   Lab Test 07/09/24  1545 06/26/24  0935   AST 16 27   ALT 16 43   ALKPHOS 90 105   BILITOTAL 0.4 0.5   I reviewed the above labs today.    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  MMR IHC intact. She completed neoadjuvant treatment with concurrent chemoradiation with weekly carboplatin and Taxol with last dose of radiation on 6/5/24, complicated by a hospitalization for radiation esophagitis. She is slowly recovering from this. Encouraged daily activity. Will repeat imaging in mid-August with a PET/CT and have follow-up with Dr. Burch. She will call sooner for concerns.     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  Now s/p endoscopic resection with clear margins free of dysplasia.  This has been adequately treated.  Continue to observe.     Odynophagia. Secondary to cancer and radiation esophagitis. She will continue on Fentanyl at 12 mcg/hr and oxycodone 10 mg every 3 hours prn. She is working with palliative care to taper her pain medication.    Nausea. Secondary to radiation esophagitis and worsened with tube feeds. She will continue to take Zofran and Compazine every 6-8 hours prn.    Weight loss: Now stabilized. She will continue with her tube feeds as directed by home infusion.    Vitamin  B12 deficiency with macrocytosis. Was on vitamin B12 tablets, but now supplemented in her tube feeds. Last vitamin B12 level was elevated, so no need for tablet supplementation currently.     Tobacco abuse. Quit on 6/4/24 when hospitalized. Congratulated her on this again. No need for nicotine replacement at this time.    Hypercalcemia. Secondary to hyperparathyroidism. Suspect this is as a sequela of her prior thyroid radiation. She was scheduled to see endocrinology on 5/10 but had to cancel the appointment. Will again request this be rescheduled. Calcium is mildly elevated--waxing and waning. Continue to monitor.     Hypothyroidism. hx of PATEL at the age of 17 for hyperthyroidism. On levothyroxine as managed by her PCP. Last TSH was low with a normal free T4. Will defer to endocrine on management.     Anemia. Secondary to vitamin B12 deficiency and worsened with chemoradiation. Discussed this will slowly improve over the next few months, last check was trending up. Will recheck in a month. Will continue to monitor.     Anxiety. Managing with hydroxyzine as directed by palliative care, which she will continue for now while tapering her opioids.     Margie Ritchie PA-C  [facility]  [address] [place] [id number]  [phone]    20 minutes spent on the date of the encounter doing chart review, review of test results, interpretation of tests, patient visit, and documentation     The longitudinal plan of care for the diagnosis(es)/condition(s) as documented were addressed during this visit. Due to the added complexity in care, I will continue to support Lita in the subsequent management and with ongoing continuity of care.           see HPI

## 2024-08-01 ENCOUNTER — APPOINTMENT (OUTPATIENT)
Dept: NEUROLOGY | Facility: CLINIC | Age: 64
End: 2024-08-01

## 2024-08-19 ENCOUNTER — RX RENEWAL (OUTPATIENT)
Age: 64
End: 2024-08-19

## 2024-08-28 ENCOUNTER — RX RENEWAL (OUTPATIENT)
Age: 64
End: 2024-08-28

## 2024-08-28 ENCOUNTER — APPOINTMENT (OUTPATIENT)
Dept: NEUROLOGY | Facility: CLINIC | Age: 64
End: 2024-08-28

## 2024-08-28 VITALS
DIASTOLIC BLOOD PRESSURE: 72 MMHG | WEIGHT: 143 LBS | SYSTOLIC BLOOD PRESSURE: 112 MMHG | BODY MASS INDEX: 25.34 KG/M2 | HEART RATE: 70 BPM | HEIGHT: 63 IN

## 2024-08-28 PROCEDURE — 99214 OFFICE O/P EST MOD 30 MIN: CPT

## 2024-08-30 NOTE — PHYSICAL EXAM
[FreeTextEntry1] : Speech is normal. minimal masking. Mild generalized dyskinesia. +1 limb rigidity 1+ L>R bradykinesia. Shuffles, +2 FOG, legs appear stiff.  No LID observed

## 2024-08-30 NOTE — HISTORY OF PRESENT ILLNESS
[FreeTextEntry1] : In the mornings, patient moves slower, which lasts for several hours  Remains delusional, believing that people are attacking her in her home. She feels that these people are causing her to move slow and feels like they are following. reports tactile hallucinations.  She is taking a higher dose of risperdal hs which her dtr feels has calmed her.  Patient fell last week while working on the yard. Able to do her ADLs independently.   nonmotor sxms:  sleeps 4 hours at night no constipation No OH  Meds: c/l 25/100 1.5tabs - 1.5-1.5-1.5-1.5 (815AM - 1030A- 12p - 2p- 5p) comtan 1/2 tab TID (815, 2, 6) sinemet CR 50/200 1 tab qhs 9pm risperdal 1mg qd    prior meds requip  azilect  nuplazid- no improvement seroquel jose l Donnelly [phone]

## 2024-08-30 NOTE — HISTORY OF PRESENT ILLNESS
[FreeTextEntry1] : In the mornings, patient moves slower, which lasts for several hours  Remains delusional, believing that people are attacking her in her home. She feels that these people are causing her to move slow and feels like they are following. reports tactile hallucinations.  She is taking a higher dose of risperdal hs which her dtr feels has calmed her.  Patient fell last week while working on the yard. Able to do her ADLs independently.   nonmotor sxms:  sleeps 4 hours at night no constipation No OH  Meds: c/l 25/100 1.5tabs - 1.5-1.5-1.5-1.5 (815AM - 1030A- 12p - 2p- 5p) comtan 1/2 tab TID (815, 2, 6) sinemet CR 50/200 1 tab qhs 9pm risperdal 1mg qd    prior meds requip  azilect  nuplazid- no improvement seroquel jose l Donnelly [phone]

## 2024-08-30 NOTE — DISCUSSION/SUMMARY
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   Patient was counseled on the following recommendations: Continue LD regimen Take LD ER earlier in evening to address nighttime offs follow up with psych, paranoia and delusions are active   f/u 3months

## 2024-09-12 ENCOUNTER — APPOINTMENT (OUTPATIENT)
Dept: NEUROLOGY | Facility: CLINIC | Age: 64
End: 2024-09-12

## 2024-09-25 ENCOUNTER — EMERGENCY (EMERGENCY)
Facility: HOSPITAL | Age: 64
LOS: 1 days | Discharge: HOME | End: 2024-09-25
Admitting: STUDENT IN AN ORGANIZED HEALTH CARE EDUCATION/TRAINING PROGRAM
Payer: MEDICAID

## 2024-09-25 VITALS
TEMPERATURE: 98 F | WEIGHT: 139.99 LBS | DIASTOLIC BLOOD PRESSURE: 93 MMHG | RESPIRATION RATE: 20 BRPM | SYSTOLIC BLOOD PRESSURE: 162 MMHG | HEART RATE: 63 BPM | OXYGEN SATURATION: 98 %

## 2024-09-25 PROCEDURE — 99283 EMERGENCY DEPT VISIT LOW MDM: CPT

## 2025-01-28 ENCOUNTER — APPOINTMENT (OUTPATIENT)
Dept: NEUROLOGY | Facility: CLINIC | Age: 65
End: 2025-01-28
Payer: MEDICAID

## 2025-01-28 VITALS
BODY MASS INDEX: 23.57 KG/M2 | HEART RATE: 68 BPM | HEIGHT: 63 IN | DIASTOLIC BLOOD PRESSURE: 76 MMHG | WEIGHT: 133 LBS | SYSTOLIC BLOOD PRESSURE: 132 MMHG

## 2025-01-28 PROCEDURE — 99214 OFFICE O/P EST MOD 30 MIN: CPT

## 2025-03-20 ENCOUNTER — NON-APPOINTMENT (OUTPATIENT)
Age: 65
End: 2025-03-20

## 2025-05-06 ENCOUNTER — APPOINTMENT (OUTPATIENT)
Dept: NEUROLOGY | Facility: CLINIC | Age: 65
End: 2025-05-06
Payer: MEDICAID

## 2025-05-06 VITALS
SYSTOLIC BLOOD PRESSURE: 145 MMHG | WEIGHT: 133 LBS | DIASTOLIC BLOOD PRESSURE: 83 MMHG | HEART RATE: 74 BPM | HEIGHT: 63 IN | BODY MASS INDEX: 23.57 KG/M2

## 2025-05-06 PROCEDURE — 99214 OFFICE O/P EST MOD 30 MIN: CPT

## 2025-05-08 RX ORDER — CARBIDOPA AND LEVODOPA 52.5; 21 MG/1; MG/1
52.5-21 CAPSULE, EXTENDED RELEASE ORAL
Qty: 180 | Refills: 3 | Status: ACTIVE | COMMUNITY
Start: 2025-05-06 | End: 1900-01-01

## 2025-06-12 ENCOUNTER — RX RENEWAL (OUTPATIENT)
Age: 65
End: 2025-06-12

## 2025-07-31 ENCOUNTER — APPOINTMENT (OUTPATIENT)
Dept: NEUROLOGY | Facility: CLINIC | Age: 65
End: 2025-07-31
Payer: MEDICAID

## 2025-07-31 VITALS
HEIGHT: 63 IN | HEART RATE: 70 BPM | WEIGHT: 134 LBS | BODY MASS INDEX: 23.74 KG/M2 | DIASTOLIC BLOOD PRESSURE: 84 MMHG | SYSTOLIC BLOOD PRESSURE: 122 MMHG

## 2025-07-31 PROCEDURE — 99214 OFFICE O/P EST MOD 30 MIN: CPT

## 2025-08-07 ENCOUNTER — APPOINTMENT (OUTPATIENT)
Dept: NEUROLOGY | Facility: CLINIC | Age: 65
End: 2025-08-07

## 2025-08-08 ENCOUNTER — NON-APPOINTMENT (OUTPATIENT)
Age: 65
End: 2025-08-08

## 2025-08-27 ENCOUNTER — RX RENEWAL (OUTPATIENT)
Age: 65
End: 2025-08-27